# Patient Record
Sex: MALE | Race: WHITE | Employment: FULL TIME | URBAN - METROPOLITAN AREA
[De-identification: names, ages, dates, MRNs, and addresses within clinical notes are randomized per-mention and may not be internally consistent; named-entity substitution may affect disease eponyms.]

---

## 2017-01-09 RX ORDER — LOSARTAN POTASSIUM 100 MG/1
TABLET ORAL
Qty: 90 TABLET | Refills: 0 | Status: SHIPPED | OUTPATIENT
Start: 2017-01-09 | End: 2017-04-07 | Stop reason: SDUPTHER

## 2017-02-23 ENCOUNTER — OFFICE VISIT (OUTPATIENT)
Dept: FAMILY MEDICINE CLINIC | Age: 57
End: 2017-02-23

## 2017-02-23 VITALS
RESPIRATION RATE: 18 BRPM | BODY MASS INDEX: 30.93 KG/M2 | TEMPERATURE: 99.4 F | OXYGEN SATURATION: 98 % | SYSTOLIC BLOOD PRESSURE: 118 MMHG | DIASTOLIC BLOOD PRESSURE: 74 MMHG | HEIGHT: 74 IN | HEART RATE: 118 BPM | WEIGHT: 241 LBS

## 2017-02-23 DIAGNOSIS — J11.1 INFLUENZA-LIKE ILLNESS: ICD-10-CM

## 2017-02-23 DIAGNOSIS — J40 BRONCHITIS: Primary | ICD-10-CM

## 2017-02-23 LAB
INFLUENZA A ANTIBODY: NORMAL
INFLUENZA B ANTIBODY: NORMAL

## 2017-02-23 PROCEDURE — 87804 INFLUENZA ASSAY W/OPTIC: CPT | Performed by: NURSE PRACTITIONER

## 2017-02-23 PROCEDURE — 99213 OFFICE O/P EST LOW 20 MIN: CPT | Performed by: NURSE PRACTITIONER

## 2017-02-23 RX ORDER — AMOXICILLIN 875 MG/1
875 TABLET, COATED ORAL 2 TIMES DAILY
Qty: 20 TABLET | Refills: 0 | Status: SHIPPED | OUTPATIENT
Start: 2017-02-23 | End: 2017-03-05

## 2017-02-23 RX ORDER — GUAIFENESIN AND CODEINE PHOSPHATE 100; 10 MG/5ML; MG/5ML
5-10 SOLUTION ORAL EVERY 4 HOURS PRN
Qty: 120 ML | Refills: 0 | Status: SHIPPED | OUTPATIENT
Start: 2017-02-23 | End: 2017-03-02

## 2017-04-07 RX ORDER — LOSARTAN POTASSIUM 100 MG/1
100 TABLET ORAL DAILY
Qty: 90 TABLET | Refills: 1 | Status: SHIPPED | OUTPATIENT
Start: 2017-04-07 | End: 2017-09-19 | Stop reason: SDUPTHER

## 2017-07-06 ENCOUNTER — TELEPHONE (OUTPATIENT)
Dept: FAMILY MEDICINE CLINIC | Age: 57
End: 2017-07-06

## 2017-07-18 ENCOUNTER — TELEPHONE (OUTPATIENT)
Dept: FAMILY MEDICINE CLINIC | Age: 57
End: 2017-07-18

## 2017-07-18 RX ORDER — SITAGLIPTIN AND METFORMIN HYDROCHLORIDE 500; 50 MG/1; MG/1
1 TABLET, FILM COATED ORAL DAILY
Qty: 90 TABLET | Refills: 3 | Status: CANCELLED | OUTPATIENT
Start: 2017-07-18

## 2017-07-18 RX ORDER — SITAGLIPTIN AND METFORMIN HYDROCHLORIDE 500; 50 MG/1; MG/1
1 TABLET, FILM COATED ORAL DAILY
Qty: 90 TABLET | Refills: 0 | Status: SHIPPED | OUTPATIENT
Start: 2017-07-18 | End: 2017-09-19 | Stop reason: SDUPTHER

## 2017-09-19 ENCOUNTER — OFFICE VISIT (OUTPATIENT)
Dept: FAMILY MEDICINE CLINIC | Age: 57
End: 2017-09-19

## 2017-09-19 VITALS
BODY MASS INDEX: 31.32 KG/M2 | HEART RATE: 92 BPM | OXYGEN SATURATION: 98 % | DIASTOLIC BLOOD PRESSURE: 72 MMHG | HEIGHT: 74 IN | SYSTOLIC BLOOD PRESSURE: 128 MMHG | RESPIRATION RATE: 12 BRPM | WEIGHT: 244 LBS

## 2017-09-19 DIAGNOSIS — F41.1 ANXIETY STATE: ICD-10-CM

## 2017-09-19 DIAGNOSIS — R35.1 NOCTURIA: ICD-10-CM

## 2017-09-19 DIAGNOSIS — E11.42 DIABETIC POLYNEUROPATHY ASSOCIATED WITH TYPE 2 DIABETES MELLITUS (HCC): ICD-10-CM

## 2017-09-19 DIAGNOSIS — E55.9 VITAMIN D DEFICIENCY: ICD-10-CM

## 2017-09-19 DIAGNOSIS — E78.00 HYPERCHOLESTEREMIA: ICD-10-CM

## 2017-09-19 DIAGNOSIS — I10 ESSENTIAL HYPERTENSION: ICD-10-CM

## 2017-09-19 DIAGNOSIS — E11.9 TYPE 2 DIABETES MELLITUS WITHOUT COMPLICATION, WITHOUT LONG-TERM CURRENT USE OF INSULIN (HCC): Primary | ICD-10-CM

## 2017-09-19 LAB
CREATININE URINE: 160.2 MG/DL (ref 39–259)
HBA1C MFR BLD: 6.2 %
MICROALBUMIN UR-MCNC: <1.2 MG/DL
MICROALBUMIN/CREAT UR-RTO: NORMAL MG/G (ref 0–30)

## 2017-09-19 PROCEDURE — 99214 OFFICE O/P EST MOD 30 MIN: CPT | Performed by: FAMILY MEDICINE

## 2017-09-19 PROCEDURE — 83036 HEMOGLOBIN GLYCOSYLATED A1C: CPT | Performed by: FAMILY MEDICINE

## 2017-09-19 RX ORDER — SITAGLIPTIN AND METFORMIN HYDROCHLORIDE 500; 50 MG/1; MG/1
1 TABLET, FILM COATED ORAL DAILY
Qty: 90 TABLET | Refills: 3 | Status: SHIPPED | OUTPATIENT
Start: 2017-09-19 | End: 2018-10-10 | Stop reason: SDUPTHER

## 2017-09-19 RX ORDER — LOSARTAN POTASSIUM 100 MG/1
100 TABLET ORAL DAILY
Qty: 90 TABLET | Refills: 3 | Status: SHIPPED | OUTPATIENT
Start: 2017-09-19 | End: 2018-10-10 | Stop reason: SDUPTHER

## 2017-09-19 ASSESSMENT — PATIENT HEALTH QUESTIONNAIRE - PHQ9
SUM OF ALL RESPONSES TO PHQ QUESTIONS 1-9: 0
2. FEELING DOWN, DEPRESSED OR HOPELESS: 0
1. LITTLE INTEREST OR PLEASURE IN DOING THINGS: 0
SUM OF ALL RESPONSES TO PHQ9 QUESTIONS 1 & 2: 0

## 2017-11-20 ENCOUNTER — NURSE ONLY (OUTPATIENT)
Dept: FAMILY MEDICINE CLINIC | Age: 57
End: 2017-11-20

## 2017-11-20 DIAGNOSIS — E78.00 HYPERCHOLESTEREMIA: ICD-10-CM

## 2017-11-20 DIAGNOSIS — E55.9 VITAMIN D DEFICIENCY: ICD-10-CM

## 2017-11-20 DIAGNOSIS — I10 ESSENTIAL HYPERTENSION: ICD-10-CM

## 2017-11-20 DIAGNOSIS — R35.1 NOCTURIA: ICD-10-CM

## 2017-11-20 LAB
A/G RATIO: 1.9 (ref 1.1–2.2)
ALBUMIN SERPL-MCNC: 4.5 G/DL (ref 3.4–5)
ALP BLD-CCNC: 75 U/L (ref 40–129)
ALT SERPL-CCNC: 17 U/L (ref 10–40)
ANION GAP SERPL CALCULATED.3IONS-SCNC: 14 MMOL/L (ref 3–16)
AST SERPL-CCNC: 13 U/L (ref 15–37)
BILIRUB SERPL-MCNC: 0.9 MG/DL (ref 0–1)
BUN BLDV-MCNC: 13 MG/DL (ref 7–20)
CALCIUM SERPL-MCNC: 9.1 MG/DL (ref 8.3–10.6)
CHLORIDE BLD-SCNC: 96 MMOL/L (ref 99–110)
CHOLESTEROL, TOTAL: 203 MG/DL (ref 0–199)
CO2: 26 MMOL/L (ref 21–32)
CREAT SERPL-MCNC: 0.6 MG/DL (ref 0.9–1.3)
GFR AFRICAN AMERICAN: >60
GFR NON-AFRICAN AMERICAN: >60
GLOBULIN: 2.4 G/DL
GLUCOSE BLD-MCNC: 253 MG/DL (ref 70–99)
HDLC SERPL-MCNC: 38 MG/DL (ref 40–60)
LDL CHOLESTEROL CALCULATED: 111 MG/DL
POTASSIUM SERPL-SCNC: 4.6 MMOL/L (ref 3.5–5.1)
PROSTATE SPECIFIC ANTIGEN: 0.52 NG/ML (ref 0–4)
SODIUM BLD-SCNC: 136 MMOL/L (ref 136–145)
TOTAL PROTEIN: 6.9 G/DL (ref 6.4–8.2)
TRIGL SERPL-MCNC: 268 MG/DL (ref 0–150)
VITAMIN D 25-HYDROXY: 28.1 NG/ML
VLDLC SERPL CALC-MCNC: 54 MG/DL

## 2017-11-20 PROCEDURE — 36415 COLL VENOUS BLD VENIPUNCTURE: CPT | Performed by: FAMILY MEDICINE

## 2017-11-24 RX ORDER — ATORVASTATIN CALCIUM 40 MG/1
40 TABLET, FILM COATED ORAL DAILY
Qty: 90 TABLET | Refills: 1 | Status: SHIPPED | OUTPATIENT
Start: 2017-11-24 | End: 2018-03-02

## 2018-01-03 ENCOUNTER — OFFICE VISIT (OUTPATIENT)
Dept: FAMILY MEDICINE CLINIC | Age: 58
End: 2018-01-03

## 2018-01-03 ENCOUNTER — TELEPHONE (OUTPATIENT)
Dept: FAMILY MEDICINE CLINIC | Age: 58
End: 2018-01-03

## 2018-01-03 VITALS
HEIGHT: 74 IN | TEMPERATURE: 97.7 F | HEART RATE: 96 BPM | BODY MASS INDEX: 30.6 KG/M2 | SYSTOLIC BLOOD PRESSURE: 122 MMHG | OXYGEN SATURATION: 99 % | RESPIRATION RATE: 18 BRPM | WEIGHT: 238.4 LBS | DIASTOLIC BLOOD PRESSURE: 84 MMHG

## 2018-01-03 DIAGNOSIS — J21.8 ACUTE BRONCHIOLITIS DUE TO OTHER SPECIFIED ORGANISMS: Primary | ICD-10-CM

## 2018-01-03 PROCEDURE — 99214 OFFICE O/P EST MOD 30 MIN: CPT | Performed by: NURSE PRACTITIONER

## 2018-01-03 RX ORDER — AZITHROMYCIN 250 MG/1
TABLET, FILM COATED ORAL
Qty: 1 PACKET | Refills: 0 | Status: SHIPPED | OUTPATIENT
Start: 2018-01-03 | End: 2018-01-13

## 2018-01-03 RX ORDER — ALBUTEROL SULFATE 90 UG/1
2 AEROSOL, METERED RESPIRATORY (INHALATION) EVERY 6 HOURS PRN
Qty: 1 INHALER | Refills: 1 | Status: SHIPPED | OUTPATIENT
Start: 2018-01-03 | End: 2018-03-02

## 2018-01-03 RX ORDER — PREDNISONE 10 MG/1
60 TABLET ORAL DAILY
Qty: 30 TABLET | Refills: 0 | Status: SHIPPED | OUTPATIENT
Start: 2018-01-03 | End: 2018-01-08

## 2018-01-03 ASSESSMENT — ENCOUNTER SYMPTOMS
COUGH: 1
GASTROINTESTINAL NEGATIVE: 1
EYES NEGATIVE: 1
SPUTUM PRODUCTION: 1

## 2018-01-03 NOTE — TELEPHONE ENCOUNTER
[facility] DRUG STORE [facility] [address] [phone] [facility] [phone]    Calling for clairfication on the inhaler - has 2 different directions

## 2018-01-03 NOTE — PROGRESS NOTES
Subjective     CC: flu like  Chief Complaint   Patient presents with    Cough     PT C/O COUGH W/BROWN PHLEGM, BILATERAL EAR PAIN, SORE THROAT, NECK PAIN, SL MILIAN, NASAL DR BROWN, PND, WHEEZING, BODY ACHES AND FEELS LIKE HAS A TEMP BUT HASN'T CHECKED X3 WKS. PT WENT TO URGENT CARE LAST WEEK AND WAS TOLD HE HAS A VIRUS BUT WAS ONLY GIVEN SUDAFED AND DECONGESTANTS. PT HAS TRIED TYLENOL. HPI  59year old male with 3 week history of flulike symptoms. Positive, fever, cough, wheezing, bodyaches, thick sputum. Worse at night when laying down. Was seen at an Urgent care told he had a virus. Has been resting and taking tylenol. Denies any n/v/d/c/sop or cp. No flu shot this year. Positive sick contacts    Objective   Vitals:    01/03/18 0938   BP: 122/84   Site: Left Arm   Position: Sitting   Cuff Size: Large Adult   Pulse: 96   Resp: 18   Temp: 97.7 °F (36.5 °C)   TempSrc: Oral   SpO2: 99%   Weight: 238 lb 6.4 oz (108.1 kg)   Height: 6' 1.5\" (1.867 m)   Review of Systems   Constitutional: Positive for fever and malaise/fatigue. HENT: Positive for congestion and ear pain. Eyes: Negative. Respiratory: Positive for cough and sputum production. Cardiovascular: Negative. Gastrointestinal: Negative. Genitourinary: Negative. Musculoskeletal: Positive for myalgias. Skin: Negative. Neurological: Negative. Body mass index is 31.03 kg/m². Physical Exam   Constitutional: He is oriented to person, place, and time. He appears well-developed and well-nourished. HENT:   Head: Normocephalic and atraumatic. Right Ear: External ear normal.   Left Ear: External ear normal.   Nose: Nose normal.   Mouth/Throat: Oropharynx is clear and moist.   Eyes: Conjunctivae and EOM are normal. Pupils are equal, round, and reactive to light. Neck: Normal range of motion. Neck supple. Cardiovascular: Normal rate, regular rhythm, normal heart sounds and intact distal pulses.     Pulmonary/Chest: Effort normal.

## 2018-03-02 ENCOUNTER — OFFICE VISIT (OUTPATIENT)
Dept: FAMILY MEDICINE CLINIC | Age: 58
End: 2018-03-02

## 2018-03-02 VITALS
WEIGHT: 236 LBS | SYSTOLIC BLOOD PRESSURE: 114 MMHG | DIASTOLIC BLOOD PRESSURE: 76 MMHG | HEART RATE: 84 BPM | BODY MASS INDEX: 30.29 KG/M2 | HEIGHT: 74 IN | RESPIRATION RATE: 16 BRPM

## 2018-03-02 DIAGNOSIS — E78.00 PURE HYPERCHOLESTEROLEMIA: ICD-10-CM

## 2018-03-02 DIAGNOSIS — E11.9 TYPE 2 DIABETES MELLITUS WITHOUT COMPLICATION, WITHOUT LONG-TERM CURRENT USE OF INSULIN (HCC): Primary | ICD-10-CM

## 2018-03-02 DIAGNOSIS — T46.4X5A ACE-INHIBITOR COUGH: ICD-10-CM

## 2018-03-02 DIAGNOSIS — R05.8 ACE-INHIBITOR COUGH: ICD-10-CM

## 2018-03-02 DIAGNOSIS — Z78.9 STATIN INTOLERANCE: ICD-10-CM

## 2018-03-02 DIAGNOSIS — I10 ESSENTIAL HYPERTENSION: ICD-10-CM

## 2018-03-02 DIAGNOSIS — F41.1 ANXIETY STATE: ICD-10-CM

## 2018-03-02 LAB — HBA1C MFR BLD: 8.6 %

## 2018-03-02 PROCEDURE — 83036 HEMOGLOBIN GLYCOSYLATED A1C: CPT | Performed by: FAMILY MEDICINE

## 2018-03-02 PROCEDURE — 99214 OFFICE O/P EST MOD 30 MIN: CPT | Performed by: FAMILY MEDICINE

## 2018-03-02 NOTE — PROGRESS NOTES
Subjective:      Patient ID: Francine Dorado is a 59 y.o. male. HPI  Walking a lot on job generally - works at [email or web address] - spends time in car  Chief Complaint   Patient presents with    Diabetes     3 MO DM ROUTINE FOLLOW UP [address] Hyperlipidemia     HYPERLIPIDEMIA Bri, DID NOT WANT TO EAT STOPPED TAKING 2 WEEKS AGO, NOT SURE HOW LONG HE WAS ON IT BUT WANTS TO CHANGE  HE IS FASTING TODAY     Other     PT IS NOT INTERESTED IN FLU ON PNEUMO      Travelling less generally  BP Readings from Last 3 Encounters:   03/02/18 114/76   01/03/18 122/84   09/19/17 128/72     Pulse Readings from Last 3 Encounters:   03/02/18 84   01/03/18 96   09/19/17 92     Wt Readings from Last 3 Encounters:   03/02/18 236 lb (107 kg)   01/03/18 238 lb 6.4 oz (108.1 kg)   09/19/17 244 lb (110.7 kg)     Watching diet closer - had flu illness - effected diet some temporarily  Eating habits better - not perfect - less cho - more fruit/ veggies  No appetite, tired, dizzy on lipitor - stopped w/ d/c of lipitor  Endangered driving - feels better significantly off statin. not checking bs often  Lab Results   Component Value Date    LABA1C 6.2 09/19/2017     Lab Results   Component Value Date    .3 04/09/2013     zoloft working ok for mood  No cp/palp/sob  Some increase in thirst, dry mouth/   Review of Systems  Vision stable - utd on eye checks  No neuropathy sx  Objective:   Physical Exam   Constitutional: He appears well-developed. No distress. HENT:   Mouth/Throat: Oropharynx is clear and moist.   Eyes: Conjunctivae are normal. No scleral icterus. Cardiovascular: Normal rate, regular rhythm and normal heart sounds. Exam reveals no gallop. No murmur heard. Pulmonary/Chest: Effort normal and breath sounds normal. No respiratory distress. He has no wheezes. He has no rhonchi. He has no rales. Abdominal: Soft. Bowel sounds are normal. He exhibits no distension.  There is no tenderness. Musculoskeletal: He exhibits no edema. Neurological: He is alert. Skin: Skin is intact. No rash noted. No erythema. Psychiatric: He has a normal mood and affect. Assessment:      1. Type 2 diabetes mellitus without complication, without long-term current use of insulin ([facility])  POCT glycosylated hemoglobin (Hb A1C)   2. Pure hypercholesterolemia     3. Anxiety state     4. ACE-inhibitor cough     5. Essential hypertension     6. Statin intolerance             Plan:      Hold on statin - d/w pt risk reduction for cv disease - bp/ bs control stressed    Diet/ exercise d/w pt  Gradually increase your exercise and increase omega 3 fatty acid sources in your diet such as fatty fish ( cod, tuna, salmon), walnuts or consider supplements such as fish oil or krill oil to boost the HDL.   Recheck labs in next 6 months  Recheck a1c 3 months  Feet care dw/ pt  Eye exam annually

## 2018-08-14 ENCOUNTER — OFFICE VISIT (OUTPATIENT)
Dept: FAMILY MEDICINE CLINIC | Age: 58
End: 2018-08-14

## 2018-08-14 VITALS
DIASTOLIC BLOOD PRESSURE: 80 MMHG | HEART RATE: 70 BPM | BODY MASS INDEX: 30.54 KG/M2 | RESPIRATION RATE: 14 BRPM | HEIGHT: 74 IN | TEMPERATURE: 97.6 F | WEIGHT: 238 LBS | SYSTOLIC BLOOD PRESSURE: 120 MMHG

## 2018-08-14 DIAGNOSIS — B96.89 ACUTE BACTERIAL SINUSITIS: Primary | ICD-10-CM

## 2018-08-14 DIAGNOSIS — J01.90 ACUTE BACTERIAL SINUSITIS: Primary | ICD-10-CM

## 2018-08-14 PROCEDURE — 99213 OFFICE O/P EST LOW 20 MIN: CPT | Performed by: REGISTERED NURSE

## 2018-08-14 RX ORDER — AMOXICILLIN AND CLAVULANATE POTASSIUM 875; 125 MG/1; MG/1
1 TABLET, FILM COATED ORAL 2 TIMES DAILY WITH MEALS
Qty: 20 TABLET | Refills: 0 | Status: SHIPPED | OUTPATIENT
Start: 2018-08-14 | End: 2018-08-24

## 2018-08-14 ASSESSMENT — ENCOUNTER SYMPTOMS
SORE THROAT: 0
TROUBLE SWALLOWING: 0
COUGH: 1
WHEEZING: 0
SINUS PAIN: 1
SINUS PRESSURE: 1
RHINORRHEA: 0
SHORTNESS OF BREATH: 0
CHEST TIGHTNESS: 0

## 2018-08-14 ASSESSMENT — PATIENT HEALTH QUESTIONNAIRE - PHQ9
SUM OF ALL RESPONSES TO PHQ QUESTIONS 1-9: 0
SUM OF ALL RESPONSES TO PHQ QUESTIONS 1-9: 0
2. FEELING DOWN, DEPRESSED OR HOPELESS: 0
1. LITTLE INTEREST OR PLEASURE IN DOING THINGS: 0
SUM OF ALL RESPONSES TO PHQ9 QUESTIONS 1 & 2: 0

## 2018-08-14 NOTE — PATIENT INSTRUCTIONS
if not pregnant, but should be given with food to avoid nausea. Avoid Ibuprofen if you have high blood pressure, CHF, or kidney problems. 6.Gargle: (DAY ONE OF SYMPTOMS) Gargle in the back of the throat with the head tilted back and to the sides with a strong mouthwash  ( Listerine or Scope) after meals and at bedtime at least 4 -5 times a day. This helps kill bacteria and viruses in the back of the throat and will shorten the duration and decrease the severity of your symptoms: sore throat, cough, ear popping,/ear pain, and possibly dizziness. 7. Smoking: Avoid smoking or exposure to second hand smoke. 8. Zinc: (DAY ONE OF SYMPTOMS)  Zinc lozenges such as Cold Elias (available most stores), or Basic ([employer] brand) will help shorten the duration and lessen symptoms such as sore throat, cough, nasal congestion, runny nose, and post nasal drip. Use 1 lozenge every 2-4 hours ( after meals if stomach is sensitive). Children can use 10-15 mg or less 3-4 times a day or Zinc lollypops. In pregnancy limit to 50-60 mg a day for 7 days as prenatals have Zinc also.    With diarrhea use zinc pills 50 mg 1/2 to 1 pill 2x/day. 9. Vitamins: Vitamin C 500 mg with breakfast and dinner. Children and pregnant women should drink citrus juices. This speeds healing and strengthens immune system. 10. Chest Symptoms: Vicks Vapor rub to the chest at bedtime. 11. Decongestants: Avoid all decongestants. Try all of the above starting with day 1 of symptoms. If Strep throat symptoms appear call to be seen in the office as soon as possible and don't gargle on that day. Newborns, infants, or anyone with earaches or influenza may need to be seen quickly. Adults with fevers over 103 degrees or shortness of breath should call the office immediately. Patient Education        Sinusitis: Care Instructions  Your Care Instructions    Sinusitis is an infection of the lining of the sinus cavities in your head.  Sinusitis often follows a days in a row. Using it for more than 3 days can make your congestion worse. When should you call for help? Call your doctor now or seek immediate medical care if:    · You have new or worse swelling or redness in your face or around your eyes.     · You have a new or higher fever.    Watch closely for changes in your health, and be sure to contact your doctor if:    · You have new or worse facial pain.     · The mucus from your nose becomes thicker (like pus) or has new blood in it.     · You are not getting better as expected. Where can you learn more? Go to [email or web address] and sign in to your [employer] account. Enter Y634 in the [employer] box to learn more about \"Sinusitis: Care Instructions. \"     If you do not have an account, please click on the \"Sign Up Now\" link. Current as of: May 12, 2017  Content Version: 11.7  © 4245-3743 [employer]. Care instructions adapted under license by [facility] ([facility]). If you have questions about a medical condition or this instruction, always ask your healthcare professional. [place] [address] any warranty or liability for your use of this information.

## 2018-08-14 NOTE — PROGRESS NOTES
[facility] Family Medicine  Clinic Note    Date: 8/14/2018                                               Subjective/Objective:     Chief Complaint   Patient presents with    Sinusitis     PT STATES POSS SINUS INFECTIONS, LOTS OF DRAINAGE, SORE THROAT, BILATERAL EAR PRESSURE, MUCUS IS BROWNISH YELLOW, ONGOING WEEK 1/2, OTC MEDICATION NOT TRIED       HPI  patient presents for possible sinus infection going on for 10 days. Patient is having a lot of drainage, sore throat, bilateral ear pressure, and mucus  Patient has not attempted to treat with anything over-the-counter. Denies fever, chills, ear discharge, hearing loss, postnasal drip, runny nose, sore throat, shortness of breath, wheezing, chest pain, and headache. Patient Active Problem List    Diagnosis Date Noted    Statin intolerance 03/02/2018    [facility] 89/42/7496    Hypertension 04/09/2013    ACE-inhibitor cough 08/24/2012    Sprain of ribs 05/23/2011    Acute bronchitis 05/23/2011    Pure hypercholesterolemia 05/23/2011    Anxiety state 05/23/2011       Past Medical History:   Diagnosis Date    Acute bronchitis     Anxiety     Hypercholesterolemia     Sprain of ribs        No past surgical history on file.     Office Visit on 03/02/2018   Component Date Value Ref Range Status    Hemoglobin A1C 03/02/2018 8.6  % Final       Family History   Problem Relation Age of Onset    Diabetes Mother     High Cholesterol Mother     Diabetes Father     High Cholesterol Father     Cancer Father         prostate    Prostate Cancer Father     Heart Failure Maternal Grandmother     Heart Disease Maternal Grandmother     Cancer Maternal Grandfather        Current Outpatient Prescriptions   Medication Sig Dispense Refill    JANUMET  MG per tablet Take 1 tablet by mouth daily 90 tablet 3    losartan (COZAAR) 100 MG tablet Take 1 tablet by mouth daily 90 tablet 3    sertraline (ZOLOFT) 50 MG tablet

## 2018-09-25 ENCOUNTER — OFFICE VISIT (OUTPATIENT)
Dept: FAMILY MEDICINE CLINIC | Age: 58
End: 2018-09-25
Payer: COMMERCIAL

## 2018-09-25 VITALS
RESPIRATION RATE: 16 BRPM | SYSTOLIC BLOOD PRESSURE: 128 MMHG | BODY MASS INDEX: 30.83 KG/M2 | HEIGHT: 74 IN | DIASTOLIC BLOOD PRESSURE: 80 MMHG | WEIGHT: 240.2 LBS | HEART RATE: 84 BPM

## 2018-09-25 DIAGNOSIS — I10 ESSENTIAL HYPERTENSION: ICD-10-CM

## 2018-09-25 DIAGNOSIS — Z23 NEED FOR PROPHYLACTIC VACCINATION AGAINST STREPTOCOCCUS PNEUMONIAE (PNEUMOCOCCUS): ICD-10-CM

## 2018-09-25 DIAGNOSIS — E55.9 VITAMIN D INSUFFICIENCY: ICD-10-CM

## 2018-09-25 DIAGNOSIS — E78.00 PURE HYPERCHOLESTEROLEMIA: ICD-10-CM

## 2018-09-25 DIAGNOSIS — E11.9 TYPE 2 DIABETES MELLITUS WITHOUT COMPLICATION, WITHOUT LONG-TERM CURRENT USE OF INSULIN (HCC): Primary | ICD-10-CM

## 2018-09-25 DIAGNOSIS — Z23 NEED FOR PROPHYLACTIC VACCINATION AND INOCULATION AGAINST VARICELLA: ICD-10-CM

## 2018-09-25 LAB
CREATININE URINE: 78.6 MG/DL (ref 39–259)
HBA1C MFR BLD: 7.4 %
MICROALBUMIN UR-MCNC: <1.2 MG/DL
MICROALBUMIN/CREAT UR-RTO: NORMAL MG/G (ref 0–30)

## 2018-09-25 PROCEDURE — 83036 HEMOGLOBIN GLYCOSYLATED A1C: CPT | Performed by: REGISTERED NURSE

## 2018-09-25 PROCEDURE — 99214 OFFICE O/P EST MOD 30 MIN: CPT | Performed by: REGISTERED NURSE

## 2018-09-25 ASSESSMENT — ENCOUNTER SYMPTOMS
DIARRHEA: 0
CHEST TIGHTNESS: 0
VOMITING: 0
CONSTIPATION: 0
WHEEZING: 0
ABDOMINAL PAIN: 0
SHORTNESS OF BREATH: 0
COUGH: 0
NAUSEA: 0

## 2018-09-25 ASSESSMENT — PATIENT HEALTH QUESTIONNAIRE - PHQ9
2. FEELING DOWN, DEPRESSED OR HOPELESS: 0
SUM OF ALL RESPONSES TO PHQ QUESTIONS 1-9: 0
1. LITTLE INTEREST OR PLEASURE IN DOING THINGS: 0
SUM OF ALL RESPONSES TO PHQ QUESTIONS 1-9: 0
SUM OF ALL RESPONSES TO PHQ9 QUESTIONS 1 & 2: 0

## 2018-09-25 NOTE — PROGRESS NOTES
[facility] Family Medicine  Clinic Note    Date: 9/25/2018                                               Subjective/Objective:     Chief Complaint   Patient presents with    Hypertension     ROUTINE VISIT FOR HTN MEDICATION REFILLS    Diabetes     ROUTINE VISIT FOR DM, A1C AND MICRO AND FOOT EXAM        HPI patient presents for routine hypertension and diabetes follow-up. Last a1c 8.6 in March. Due for Micro albumin and foot exam today. Diabetes Mellitus Type II:  Home blood sugar records: patient does not test.  No significant episodes of hypoglycemia. No polyuria, polydipsia, visual changes, foot problems, GI upset. Diabetic diet compliance:  noncompliant: not all the time, but has been watching sugar intake. Weight trend: stable. Current exercise: walking a lot  during work, but no other physical activity. Eye exam current (within one year): yes. Lab Results   Component Value Date    LABA1C 7.4 09/25/2018    LABA1C 8.6 03/02/2018    LABA1C 6.2 09/19/2017     Lab Results   Component Value Date    LABMICR <1.20 09/19/2017    CREATININE 0.6 (L) 11/20/2017     Lab Results   Component Value Date    ALT 17 11/20/2017    AST 13 (L) 11/20/2017     No components found for: CHLPL  Lab Results   Component Value Date    TRIG 268 (H) 11/20/2017     Lab Results   Component Value Date    HDL 38 (L) 11/20/2017     Lab Results   Component Value Date    LDLCALC 111 (H) 11/20/2017       Hypertension:  Denies CP, SOB, visual changes, dizziness, palpitations or HA. He is adherent to a low sodium diet. Blood pressure typically runs 120/80 outside of the office. Hyperlipidemia: Statin intolerance. Patient has been trying to manage with diet. Has not been watching diet as well as he should. Has not been taking Omega 3 fish oil or baby aspirin.          Patient Active Problem List    Diagnosis Date Noted    Statin intolerance 03/02/2018    Type 2 diabetes mellitus without complication ([facility] [facility] 46/06/7776    place, and time. He appears well-developed and well-nourished. HENT:   Head: Normocephalic and atraumatic. Right Ear: Tympanic membrane, external ear and ear canal normal.   Left Ear: Tympanic membrane, external ear and ear canal normal.   Nose: Nose normal.   Mouth/Throat: Uvula is midline, oropharynx is clear and moist and mucous membranes are normal.   Eyes: Pupils are equal, round, and reactive to light. Conjunctivae and EOM are normal.   Neck: Normal range of motion. Neck supple. Normal carotid pulses present. Carotid bruit is not present. No thyromegaly present. Cardiovascular: Normal rate, regular rhythm, normal heart sounds and intact distal pulses. Pulmonary/Chest: Effort normal and breath sounds normal.   Lymphadenopathy:     He has no cervical adenopathy. Neurological: He is alert and oriented to person, place, and time. Visual inspection:  Deformity/amputation: absent  Skin lesions/pre-ulcerative calluses: absent  Edema: right- negative, left- negative    Sensory exam:  Monofilament sensation: normal  (minimum of 5 random plantar locations tested, avoiding callused areas - > 1 area with absence of sensation is + for neuropathy)    Plus at least one of the following:  Pulses: normal,   Pinprick: Intact  Proprioception: Intact  Vibration (128 Hz): Intact     Skin: Skin is warm and dry. Psychiatric: He has a normal mood and affect. His behavior is normal. Judgment and thought content normal.   Nursing note and vitals reviewed. Assessment/Plan     1. Type 2 diabetes mellitus without complication, without long-term current use of insulin ([facility])  A1c down to 7. 4! Patient wishes to continue to attempt lifestyle changes prior to increasing medication. DM foot exam completednormal.  Requested DM [facility] [employer]- completed this year. Collected microalbumin today. Educated patient on importance of portion control and increasing cardio exercise.   -  DIABETES FOOT EXAM  - POCT glycosylated hemoglobin (Hb A1C)  - Microalbumin / Creatinine Urine Ratio; Future  - Microalbumin / Creatinine Urine Ratio  - Comprehensive Metabolic Panel; Future    2. Essential hypertension  BP stable. Goal <130/80 continue current regimen. Check renal function. - Comprehensive Metabolic Panel; Future  - Lipid Panel; Future    3. Pure hypercholesterolemia  Instructed patient on importance of taking daily 81 mg aspirin and omega-3 Fish oil. Check fasting lipids. - Lipid Panel; Future    4. Need for prophylactic vaccination against Streptococcus pneumoniae (pneumococcus)  Declines. 5. Need for prophylactic vaccination and inoculation against varicella  Due.  - zoster recombinant adjuvanted vaccine (SHINGRIX) 50 MCG SUSR injection; 50 MCG IM then repeat 2-6 months. Dispense: 0.5 mL; Refill: 1    6. Vitamin D insufficiency  Check Vit D levels. - Vitamin D 25 Hydroxy; Future      Orders Placed This Encounter   Procedures    Microalbumin / Creatinine Urine Ratio     Standing Status:   Future     Number of Occurrences:   1     Standing Expiration Date:   9/25/2019    Comprehensive Metabolic Panel     Standing Status:   Future     Standing Expiration Date:   9/25/2019    Lipid Panel     Standing Status:   Future     Standing Expiration Date:   9/25/2019     Order Specific Question:   Is Patient Fasting?/# of Hours     Answer:   10    Vitamin D 25 Hydroxy     Standing Status:   Future     Standing Expiration Date:   9/25/2019    POCT glycosylated hemoglobin (Hb A1C)    HM DIABETES FOOT EXAM       Return in about 3 months (around 12/25/2018) for HTN, Hypyerlipidemia, DM with Dr. Artie Bains.     Dalia Sever, NP    9/25/2018  3:13 PM

## 2018-10-02 ENCOUNTER — NURSE ONLY (OUTPATIENT)
Dept: FAMILY MEDICINE CLINIC | Age: 58
End: 2018-10-02
Payer: COMMERCIAL

## 2018-10-02 DIAGNOSIS — E55.9 VITAMIN D INSUFFICIENCY: ICD-10-CM

## 2018-10-02 DIAGNOSIS — I10 ESSENTIAL HYPERTENSION: ICD-10-CM

## 2018-10-02 DIAGNOSIS — E11.9 TYPE 2 DIABETES MELLITUS WITHOUT COMPLICATION, WITHOUT LONG-TERM CURRENT USE OF INSULIN (HCC): ICD-10-CM

## 2018-10-02 DIAGNOSIS — E78.00 PURE HYPERCHOLESTEROLEMIA: ICD-10-CM

## 2018-10-02 LAB
A/G RATIO: 2 (ref 1.1–2.2)
ALBUMIN SERPL-MCNC: 4.5 G/DL (ref 3.4–5)
ALP BLD-CCNC: 90 U/L (ref 40–129)
ALT SERPL-CCNC: 14 U/L (ref 10–40)
ANION GAP SERPL CALCULATED.3IONS-SCNC: 11 MMOL/L (ref 3–16)
AST SERPL-CCNC: 11 U/L (ref 15–37)
BILIRUB SERPL-MCNC: 0.8 MG/DL (ref 0–1)
BUN BLDV-MCNC: 12 MG/DL (ref 7–20)
CALCIUM SERPL-MCNC: 9.1 MG/DL (ref 8.3–10.6)
CHLORIDE BLD-SCNC: 100 MMOL/L (ref 99–110)
CHOLESTEROL, TOTAL: 188 MG/DL (ref 0–199)
CO2: 26 MMOL/L (ref 21–32)
CREAT SERPL-MCNC: 0.7 MG/DL (ref 0.8–1.3)
GFR AFRICAN AMERICAN: >60
GFR NON-AFRICAN AMERICAN: >60
GLOBULIN: 2.3 G/DL
GLUCOSE BLD-MCNC: 248 MG/DL (ref 70–99)
HDLC SERPL-MCNC: 39 MG/DL (ref 40–60)
LDL CHOLESTEROL CALCULATED: 95 MG/DL
POTASSIUM SERPL-SCNC: 5.2 MMOL/L (ref 3.5–5.1)
SODIUM BLD-SCNC: 137 MMOL/L (ref 136–145)
TOTAL PROTEIN: 6.8 G/DL (ref 6.4–8.2)
TRIGL SERPL-MCNC: 270 MG/DL (ref 0–150)
VITAMIN D 25-HYDROXY: 26.2 NG/ML
VLDLC SERPL CALC-MCNC: 54 MG/DL

## 2018-10-02 PROCEDURE — 36415 COLL VENOUS BLD VENIPUNCTURE: CPT | Performed by: REGISTERED NURSE

## 2018-10-10 ENCOUNTER — TELEPHONE (OUTPATIENT)
Dept: FAMILY MEDICINE CLINIC | Age: 58
End: 2018-10-10

## 2018-10-10 RX ORDER — SITAGLIPTIN AND METFORMIN HYDROCHLORIDE 500; 50 MG/1; MG/1
1 TABLET, FILM COATED ORAL DAILY
Qty: 90 TABLET | Refills: 1 | Status: SHIPPED | OUTPATIENT
Start: 2018-10-10 | End: 2019-02-20 | Stop reason: SDUPTHER

## 2018-10-10 RX ORDER — LOSARTAN POTASSIUM 100 MG/1
100 TABLET ORAL DAILY
Qty: 90 TABLET | Refills: 1 | Status: SHIPPED | OUTPATIENT
Start: 2018-10-10 | End: 2019-04-08 | Stop reason: SDUPTHER

## 2019-02-20 ENCOUNTER — OFFICE VISIT (OUTPATIENT)
Dept: FAMILY MEDICINE CLINIC | Age: 59
End: 2019-02-20
Payer: COMMERCIAL

## 2019-02-20 VITALS
WEIGHT: 240 LBS | SYSTOLIC BLOOD PRESSURE: 124 MMHG | RESPIRATION RATE: 14 BRPM | HEART RATE: 80 BPM | DIASTOLIC BLOOD PRESSURE: 78 MMHG | HEIGHT: 73 IN | BODY MASS INDEX: 31.81 KG/M2

## 2019-02-20 DIAGNOSIS — E87.5 HYPERKALEMIA: ICD-10-CM

## 2019-02-20 DIAGNOSIS — E78.5 HYPERLIPIDEMIA, UNSPECIFIED HYPERLIPIDEMIA TYPE: ICD-10-CM

## 2019-02-20 DIAGNOSIS — I10 ESSENTIAL HYPERTENSION: ICD-10-CM

## 2019-02-20 DIAGNOSIS — E11.69 DIABETES MELLITUS TYPE 2 IN OBESE (HCC): Primary | ICD-10-CM

## 2019-02-20 DIAGNOSIS — E66.9 DIABETES MELLITUS TYPE 2 IN OBESE (HCC): Primary | ICD-10-CM

## 2019-02-20 LAB
ANION GAP SERPL CALCULATED.3IONS-SCNC: 17 MMOL/L (ref 3–16)
BUN BLDV-MCNC: 10 MG/DL (ref 7–20)
CALCIUM SERPL-MCNC: 9.3 MG/DL (ref 8.3–10.6)
CHLORIDE BLD-SCNC: 98 MMOL/L (ref 99–110)
CO2: 24 MMOL/L (ref 21–32)
CREAT SERPL-MCNC: 0.7 MG/DL (ref 0.8–1.3)
GFR AFRICAN AMERICAN: >60
GFR NON-AFRICAN AMERICAN: >60
GLUCOSE BLD-MCNC: 369 MG/DL (ref 70–99)
POTASSIUM SERPL-SCNC: 4.8 MMOL/L (ref 3.5–5.1)
SODIUM BLD-SCNC: 139 MMOL/L (ref 136–145)

## 2019-02-20 PROCEDURE — 99214 OFFICE O/P EST MOD 30 MIN: CPT | Performed by: FAMILY MEDICINE

## 2019-02-20 RX ORDER — SILDENAFIL 100 MG/1
50-100 TABLET, FILM COATED ORAL PRN
Qty: 6 TABLET | Refills: 5 | Status: SHIPPED | OUTPATIENT
Start: 2019-02-20 | End: 2020-10-12

## 2019-02-20 RX ORDER — SITAGLIPTIN AND METFORMIN HYDROCHLORIDE 500; 50 MG/1; MG/1
1-2 TABLET, FILM COATED ORAL DAILY
Qty: 180 TABLET | Refills: 3 | Status: SHIPPED | OUTPATIENT
Start: 2019-02-20 | End: 2019-03-28 | Stop reason: SDUPTHER

## 2019-02-21 LAB
ESTIMATED AVERAGE GLUCOSE: 165.7 MG/DL
HBA1C MFR BLD: 7.4 %

## 2019-03-28 RX ORDER — SITAGLIPTIN AND METFORMIN HYDROCHLORIDE 500; 50 MG/1; MG/1
1-2 TABLET, FILM COATED ORAL DAILY
Qty: 180 TABLET | Refills: 3 | Status: SHIPPED | OUTPATIENT
Start: 2019-03-28 | End: 2020-06-23 | Stop reason: SDUPTHER

## 2019-04-08 RX ORDER — LOSARTAN POTASSIUM 100 MG/1
TABLET ORAL
Qty: 90 TABLET | Refills: 1 | Status: SHIPPED | OUTPATIENT
Start: 2019-04-08 | End: 2019-10-05 | Stop reason: SDUPTHER

## 2019-08-29 ENCOUNTER — OFFICE VISIT (OUTPATIENT)
Dept: FAMILY MEDICINE CLINIC | Age: 59
End: 2019-08-29
Payer: COMMERCIAL

## 2019-08-29 VITALS
WEIGHT: 242 LBS | BODY MASS INDEX: 31.06 KG/M2 | DIASTOLIC BLOOD PRESSURE: 72 MMHG | HEART RATE: 82 BPM | HEIGHT: 74 IN | SYSTOLIC BLOOD PRESSURE: 114 MMHG | RESPIRATION RATE: 14 BRPM

## 2019-08-29 DIAGNOSIS — E66.9 DIABETES MELLITUS TYPE 2 IN OBESE (HCC): Primary | ICD-10-CM

## 2019-08-29 DIAGNOSIS — Z11.59 ENCOUNTER FOR HEPATITIS C SCREENING TEST FOR LOW RISK PATIENT: ICD-10-CM

## 2019-08-29 DIAGNOSIS — Z12.5 PROSTATE CANCER SCREENING: ICD-10-CM

## 2019-08-29 DIAGNOSIS — E11.69 DIABETES MELLITUS TYPE 2 IN OBESE (HCC): Primary | ICD-10-CM

## 2019-08-29 DIAGNOSIS — E78.5 HYPERLIPIDEMIA, UNSPECIFIED HYPERLIPIDEMIA TYPE: ICD-10-CM

## 2019-08-29 DIAGNOSIS — E55.9 VITAMIN D DEFICIENCY: ICD-10-CM

## 2019-08-29 DIAGNOSIS — I10 ESSENTIAL HYPERTENSION: ICD-10-CM

## 2019-08-29 LAB
A/G RATIO: 2 (ref 1.1–2.2)
ALBUMIN SERPL-MCNC: 5.1 G/DL (ref 3.4–5)
ALP BLD-CCNC: 91 U/L (ref 40–129)
ALT SERPL-CCNC: 16 U/L (ref 10–40)
ANION GAP SERPL CALCULATED.3IONS-SCNC: 17 MMOL/L (ref 3–16)
AST SERPL-CCNC: 16 U/L (ref 15–37)
BILIRUB SERPL-MCNC: 1.1 MG/DL (ref 0–1)
BUN BLDV-MCNC: 15 MG/DL (ref 7–20)
CALCIUM SERPL-MCNC: 9.8 MG/DL (ref 8.3–10.6)
CHLORIDE BLD-SCNC: 96 MMOL/L (ref 99–110)
CHOLESTEROL, TOTAL: 199 MG/DL (ref 0–199)
CO2: 22 MMOL/L (ref 21–32)
CREAT SERPL-MCNC: 0.8 MG/DL (ref 0.8–1.3)
GFR AFRICAN AMERICAN: >60
GFR NON-AFRICAN AMERICAN: >60
GLOBULIN: 2.6 G/DL
GLUCOSE BLD-MCNC: 246 MG/DL (ref 70–99)
HBA1C MFR BLD: 6.9 %
HDLC SERPL-MCNC: 41 MG/DL (ref 40–60)
HEPATITIS C ANTIBODY INTERPRETATION: NORMAL
LDL CHOLESTEROL CALCULATED: 108 MG/DL
POTASSIUM SERPL-SCNC: 4.8 MMOL/L (ref 3.5–5.1)
PROSTATE SPECIFIC ANTIGEN: 0.6 NG/ML (ref 0–4)
SODIUM BLD-SCNC: 135 MMOL/L (ref 136–145)
TOTAL PROTEIN: 7.7 G/DL (ref 6.4–8.2)
TRIGL SERPL-MCNC: 251 MG/DL (ref 0–150)
VITAMIN D 25-HYDROXY: 33.5 NG/ML
VLDLC SERPL CALC-MCNC: 50 MG/DL

## 2019-08-29 PROCEDURE — 83036 HEMOGLOBIN GLYCOSYLATED A1C: CPT | Performed by: FAMILY MEDICINE

## 2019-08-29 PROCEDURE — 99214 OFFICE O/P EST MOD 30 MIN: CPT | Performed by: FAMILY MEDICINE

## 2019-08-29 ASSESSMENT — PATIENT HEALTH QUESTIONNAIRE - PHQ9
2. FEELING DOWN, DEPRESSED OR HOPELESS: 0
SUM OF ALL RESPONSES TO PHQ QUESTIONS 1-9: 0
SUM OF ALL RESPONSES TO PHQ9 QUESTIONS 1 & 2: 0
SUM OF ALL RESPONSES TO PHQ QUESTIONS 1-9: 0
1. LITTLE INTEREST OR PLEASURE IN DOING THINGS: 0

## 2019-08-29 NOTE — PROGRESS NOTES
or ex partner: Not on file     Emotionally abused: Not on file     Physically abused: Not on file     Forced sexual activity: Not on file   Other Topics Concern    Not on file   Social History Narrative    Not on file        Family History   Problem Relation Age of Onset    Diabetes Mother     High Cholesterol Mother     Diabetes Father     High Cholesterol Father     Cancer Father         prostate    Prostate Cancer Father     Heart Failure Maternal Grandmother     Heart Disease Maternal Grandmother     Cancer Maternal Grandfather         Physical Exam:  Physical Exam   Constitutional: He is oriented to person, place, and time. He appears well-developed and well-nourished. No distress. HENT:   Head: Normocephalic and atraumatic. Eyes: Conjunctivae are normal. No scleral icterus. Cardiovascular: Normal rate, regular rhythm, normal heart sounds and intact distal pulses. No murmur heard. Pulmonary/Chest: Effort normal and breath sounds normal. No respiratory distress. He has no wheezes. He has no rhonchi. He has no rales. Abdominal: Soft. He exhibits no distension. There is no tenderness. Musculoskeletal: He exhibits no edema. Neurological: He is alert and oriented to person, place, and time. 5/5 monofilament testing. Sensation grossly intact. Skin: Skin is warm and dry. Feet clear. No obvious lesion on face/ around eye   Psychiatric: He has a normal mood and affect. Nursing note and vitals reviewed.          Laboratory Studies:  Lab Results   Component Value Date    LABA1C 6.9 08/29/2019    LABA1C 7.4 02/20/2019    LABA1C 7.4 09/25/2018        No results found for: WBC, HGB, HCT, MCV, PLT     Lab Results   Component Value Date     02/20/2019    K 4.8 02/20/2019    CL 98 (L) 02/20/2019    CO2 24 02/20/2019    BUN 10 02/20/2019    CREATININE 0.7 (L) 02/20/2019    GLUCOSE 369 (H) 02/20/2019    CALCIUM 9.3 02/20/2019    PROT 6.8 10/02/2018    LABALBU 4.5 10/02/2018    BILITOT 0.8

## 2019-08-30 RX ORDER — PRAVASTATIN SODIUM 40 MG
40 TABLET ORAL EVERY EVENING
Qty: 30 TABLET | Refills: 3 | Status: SHIPPED | OUTPATIENT
Start: 2019-08-30 | End: 2021-11-18 | Stop reason: SDUPTHER

## 2019-09-03 ENCOUNTER — TELEPHONE (OUTPATIENT)
Dept: FAMILY MEDICINE CLINIC | Age: 59
End: 2019-09-03

## 2019-10-07 RX ORDER — LOSARTAN POTASSIUM 100 MG/1
TABLET ORAL
Qty: 90 TABLET | Refills: 4 | Status: SHIPPED | OUTPATIENT
Start: 2019-10-07 | End: 2020-12-14 | Stop reason: SDUPTHER

## 2019-12-05 ENCOUNTER — OFFICE VISIT (OUTPATIENT)
Dept: FAMILY MEDICINE CLINIC | Age: 59
End: 2019-12-05
Payer: COMMERCIAL

## 2019-12-05 ENCOUNTER — TELEPHONE (OUTPATIENT)
Dept: FAMILY MEDICINE CLINIC | Age: 59
End: 2019-12-05

## 2019-12-05 VITALS
HEART RATE: 70 BPM | SYSTOLIC BLOOD PRESSURE: 120 MMHG | DIASTOLIC BLOOD PRESSURE: 74 MMHG | HEIGHT: 74 IN | BODY MASS INDEX: 31.5 KG/M2 | RESPIRATION RATE: 16 BRPM

## 2019-12-05 DIAGNOSIS — L08.9 FINGER INFECTION: Primary | ICD-10-CM

## 2019-12-05 PROCEDURE — 99213 OFFICE O/P EST LOW 20 MIN: CPT | Performed by: FAMILY MEDICINE

## 2019-12-05 RX ORDER — CEPHALEXIN 500 MG/1
500 CAPSULE ORAL 3 TIMES DAILY
Qty: 15 CAPSULE | Refills: 0 | Status: SHIPPED | OUTPATIENT
Start: 2019-12-05 | End: 2019-12-10

## 2020-06-23 ENCOUNTER — OFFICE VISIT (OUTPATIENT)
Dept: FAMILY MEDICINE CLINIC | Age: 60
End: 2020-06-23
Payer: COMMERCIAL

## 2020-06-23 VITALS
BODY MASS INDEX: 31.5 KG/M2 | HEART RATE: 82 BPM | SYSTOLIC BLOOD PRESSURE: 132 MMHG | DIASTOLIC BLOOD PRESSURE: 78 MMHG | HEIGHT: 74 IN

## 2020-06-23 LAB
A/G RATIO: 2 (ref 1.1–2.2)
ALBUMIN SERPL-MCNC: 4.5 G/DL (ref 3.4–5)
ALP BLD-CCNC: 85 U/L (ref 40–129)
ALT SERPL-CCNC: 10 U/L (ref 10–40)
ANION GAP SERPL CALCULATED.3IONS-SCNC: 14 MMOL/L (ref 3–16)
AST SERPL-CCNC: 13 U/L (ref 15–37)
BILIRUB SERPL-MCNC: 1 MG/DL (ref 0–1)
BUN BLDV-MCNC: 8 MG/DL (ref 7–20)
CALCIUM SERPL-MCNC: 9.1 MG/DL (ref 8.3–10.6)
CHLORIDE BLD-SCNC: 97 MMOL/L (ref 99–110)
CHOLESTEROL, TOTAL: 182 MG/DL (ref 0–199)
CO2: 25 MMOL/L (ref 21–32)
CREAT SERPL-MCNC: 0.7 MG/DL (ref 0.8–1.3)
CREATININE URINE POCT: 200
GFR AFRICAN AMERICAN: >60
GFR NON-AFRICAN AMERICAN: >60
GLOBULIN: 2.3 G/DL
GLUCOSE BLD-MCNC: 167 MG/DL (ref 70–99)
HBA1C MFR BLD: 9.8 %
HDLC SERPL-MCNC: 43 MG/DL (ref 40–60)
LDL CHOLESTEROL CALCULATED: 97 MG/DL
MICROALBUMIN/CREAT 24H UR: 30 MG/G{CREAT}
MICROALBUMIN/CREAT UR-RTO: 30
POTASSIUM SERPL-SCNC: 4.4 MMOL/L (ref 3.5–5.1)
SODIUM BLD-SCNC: 136 MMOL/L (ref 136–145)
TOTAL PROTEIN: 6.8 G/DL (ref 6.4–8.2)
TRIGL SERPL-MCNC: 208 MG/DL (ref 0–150)
VLDLC SERPL CALC-MCNC: 42 MG/DL

## 2020-06-23 PROCEDURE — 83036 HEMOGLOBIN GLYCOSYLATED A1C: CPT | Performed by: NURSE PRACTITIONER

## 2020-06-23 PROCEDURE — 82044 UR ALBUMIN SEMIQUANTITATIVE: CPT | Performed by: NURSE PRACTITIONER

## 2020-06-23 PROCEDURE — 99213 OFFICE O/P EST LOW 20 MIN: CPT | Performed by: NURSE PRACTITIONER

## 2020-06-23 RX ORDER — CYCLOBENZAPRINE HCL 10 MG
10 TABLET ORAL 3 TIMES DAILY PRN
Qty: 21 TABLET | Refills: 0 | Status: SHIPPED | OUTPATIENT
Start: 2020-06-23 | End: 2020-07-03

## 2020-06-23 RX ORDER — SITAGLIPTIN AND METFORMIN HYDROCHLORIDE 500; 50 MG/1; MG/1
1 TABLET, FILM COATED ORAL 2 TIMES DAILY WITH MEALS
Qty: 180 TABLET | Refills: 0 | Status: SHIPPED | OUTPATIENT
Start: 2020-06-23 | End: 2020-09-22

## 2020-06-23 ASSESSMENT — PATIENT HEALTH QUESTIONNAIRE - PHQ9
SUM OF ALL RESPONSES TO PHQ QUESTIONS 1-9: 0
1. LITTLE INTEREST OR PLEASURE IN DOING THINGS: 0
SUM OF ALL RESPONSES TO PHQ9 QUESTIONS 1 & 2: 0
SUM OF ALL RESPONSES TO PHQ QUESTIONS 1-9: 0
2. FEELING DOWN, DEPRESSED OR HOPELESS: 0

## 2020-06-23 NOTE — PROGRESS NOTES
lipid     2020     Amelia Pereira (:  1960) is a 56 y.o. male, here for evaluation of the following medical concerns:    HPI   Chief Complaint   Patient presents with    Diabetes     ROUTINE DM FOLLOW UP WITH A1C- HE IS FASTING TODAY IF NEEDS OTHER BLOODWORK. LIPIDS LAST DONE IN AUGUST. Treatment Adherence:   Medication compliance:  compliant all of the time  Diet compliance:  noncompliant: NOT [place]  Weight trend: increasing  Current exercise: no regular exercise DUE TO COVID - BUT VERY ACTIVE  AT WORK  Barriers: none    Diabetes Mellitus Type 2: Current symptoms/problems include none. Home blood sugar records: patient does not test  Any episodes of hypoglycemia? no  Eye exam current (within one year): yes  [facility]   Tobacco history: He  reports that he has never smoked. He has never used smokeless tobacco.   Daily Aspirin? No: HE WILL TAKE 81 MG    Hypertension:  Home blood pressure monitoring: No.IT IS USUALLY PRETTY GOOD LAST CHECKED IT 3/20  He is adherent to a low sodium diet. Patient denies chest pain, shortness of breath, headache, lightheadedness, blurred vision, peripheral edema, palpitations, dry cough and fatigue. Antihypertensive medication side effects: no medication side effects noted. Use of agents associated with hypertension: none. Hyperlipidemia:  No new myalgias or GI upset on pravastatin (Pravachol).          PT STATES HE HAD A FALL THIS WEEKEND SLIPPED ON HARDWOOD FLOORAND FELL ON RIGHT SIDE HURTING HIS RIBS- NO BRUISING NOTED - TENDER TO TOUCH - ACHING PAIN NOTED WHEN BREATHING- HE RATES THE PAIN /DISCOMFORT  7/10 HE HAS NOT TRIED ANYTHING    Lab Results   Component Value Date    LABA1C 6.9 2019    LABA1C 7.4 2019    LABA1C 7.4 2018     Lab Results   Component Value Date    LABMICR <1.20 2018    CREATININE 0.8 2019       Lab Results   Component Value Date    CHOL 199 2019    TRIG

## 2020-06-23 NOTE — PATIENT INSTRUCTIONS
the plate format. Talk to your doctor, a dietitian, or a diabetes educator about your concerns. Carbohydrate counting  With carbohydrate counting, you plan meals based on the amount of carbohydrate in each food. Carbohydrate raises blood sugar higher and more quickly than any other nutrient. It is found in desserts, breads and cereals, and fruit. It's also found in starchy vegetables such as potatoes and corn, grains such as rice and pasta, and milk and yogurt. Spreading carbohydrate throughout the day helps keep your blood sugar levels within your target range. Your daily amount depends on several things, including your weight, how active you are, which diabetes medicines you take, and what your goals are for your blood sugar levels. A registered dietitian or diabetes educator can help you plan how much carbohydrate to include in each meal and snack. A guideline for your daily amount of carbohydrate is:  · 45 to 60 grams at each meal. That's about the same as 3 to 4 carbohydrate servings. · 15 to 20 grams at each snack. That's about the same as 1 carbohydrate serving. The Nutrition Facts label on packaged foods tells you how much carbohydrate is in a serving of the food. First, look at the serving size on the food label. Is that the amount you eat in a serving? All of the nutrition information on a food label is based on that serving size. So if you eat more or less than that, you'll need to adjust the other numbers. Total carbohydrate is the next thing you need to look for on the label. If you count carbohydrate servings, one serving of carbohydrate is 15 grams. For foods that don't come with labels, such as fresh fruits and vegetables, you'll need a guide that lists carbohydrate in these foods. Ask your doctor, dietitian, or diabetes educator about books or other nutrition guides you can use.   If you take insulin, you need to know how many grams of carbohydrate are in a meal. This lets you know how much This will help lower your blood sugar. What else should you know? · Limit saturated fat, such as the fat from meat and dairy products. This is a healthy choice because people who have diabetes are at higher risk of heart disease. So choose lean cuts of meat and nonfat or low-fat dairy products. Use olive or canola oil instead of butter or shortening when cooking. · Don't skip meals. Your blood sugar may drop too low if you skip meals and take insulin or certain medicines for diabetes. · Check with your doctor before you drink alcohol. Alcohol can cause your blood sugar to drop too low. Alcohol can also cause a bad reaction if you take certain diabetes medicines. Follow-up care is a key part of your treatment and safety. Be sure to make and go to all appointments, and call your doctor if you are having problems. It's also a good idea to know your test results and keep a list of the medicines you take. Where can you learn more? Go to https://Overwatch.BoosterMedia. org and sign in to your [employer] account. Enter X321 in the HeyLets box to learn more about \"Learning About Diabetes Food Guidelines. \"     If you do not have an account, please click on the \"Sign Up Now\" link. Current as of: December 20, 2019               Content Version: 12.5  © 0018-2113 Healthwise, Incorporated. Care instructions adapted under license by [facility]. If you have questions about a medical condition or this instruction, always ask your healthcare professional. [place] [address] any warranty or liability for your use of this information. Patient Education        DASH Diet: Care Instructions  Your Care Instructions     The DASH diet is an eating plan that can help lower your blood pressure. DASH stands for Dietary Approaches to Stop Hypertension. Hypertension is high blood pressure. The DASH diet focuses on eating foods that are high in calcium, potassium, and magnesium.  These nutrients can lower blood pressure. The foods that are highest in these nutrients are fruits, vegetables, low-fat dairy products, nuts, seeds, and legumes. But taking calcium, potassium, and magnesium supplements instead of eating foods that are high in those nutrients does not have the same effect. The DASH diet also includes whole grains, fish, and poultry. The DASH diet is one of several lifestyle changes your doctor may recommend to lower your high blood pressure. Your doctor may also want you to decrease the amount of sodium in your diet. Lowering sodium while following the DASH diet can lower blood pressure even further than just the DASH diet alone. Follow-up care is a key part of your treatment and safety. Be sure to make and go to all appointments, and call your doctor if you are having problems. It's also a good idea to know your test results and keep a list of the medicines you take. How can you care for yourself at home? Following the DASH diet  · Eat 4 to 5 servings of fruit each day. A serving is 1 medium-sized piece of fruit, ½ cup chopped or canned fruit, 1/4 cup dried fruit, or 4 ounces (½ cup) of fruit juice. Choose fruit more often than fruit juice. · Eat 4 to 5 servings of vegetables each day. A serving is 1 cup of lettuce or raw leafy vegetables, ½ cup of chopped or cooked vegetables, or 4 ounces (½ cup) of vegetable juice. Choose vegetables more often than vegetable juice. · Get 2 to 3 servings of low-fat and fat-free dairy each day. A serving is 8 ounces of milk, 1 cup of yogurt, or 1 ½ ounces of cheese. · Eat 6 to 8 servings of grains each day. A serving is 1 slice of bread, 1 ounce of dry cereal, or ½ cup of cooked rice, pasta, or cooked cereal. Try to choose whole-grain products as much as possible. · Limit lean meat, poultry, and fish to 2 servings each day. A serving is 3 ounces, about the size of a deck of cards.   · Eat 4 to 5 servings of nuts, seeds, and legumes (cooked dried 9069-4999 Healthwise, Incorporated. Care instructions adapted under license by [facility] If you have questions about a medical condition or this instruction, always ask your healthcare professional. [address] any warranty or liability for your use of this information. Patient Education        Bruised Rib: Care Instructions  Overview     You can get a bruised rib if you fall or get hit, such as in an accident or while playing sports. The medical term for a bruise is \"contusion. \" Small blood vessels get torn and leak blood under the skin. Most people think of a bruise as a black-and-blue area. But bones and muscles can also get bruised. An injury may damage the rib but not cause a bruise that you can see. Sometimes it can be hard to tell if a rib is bruised or broken. The symptoms may be the same. And a broken bone can't always be seen on an X-ray. But the treatment for a bruised rib is often the same as treatment for a broken one. An injury to the ribs can cause pain. The pain may be worse when you breathe deeply, cough, or sneeze. In most cases, a bruised rib will heal on its own. You can take pain medicine while the rib mends. Pain relief allows you to take deep breaths. Follow-up care is a key part of your treatment and safety. Be sure to make and go to all appointments, and call your doctor if you are having problems. It's also a good idea to know your test results and keep a list of the medicines you take. How can you care for yourself at home? · Rest and protect the injured or sore area. Stop, change, or take a break from any activity that causes pain. · Put ice or a cold pack on the area for 10 to 20 minutes at a time. Put a thin cloth between the ice and your skin. · After 2 or 3 days, if your swelling is gone, put a heating pad set on low or a warm cloth on your chest. Some doctors suggest that you go back and forth between hot and cold.  Put a thin cloth between the heating pad and your skin. · Ask your doctor if you can take an over-the-counter pain medicine, such as acetaminophen (Tylenol), ibuprofen (Advil, Motrin), or naproxen (Aleve). Be safe with medicines. Read and follow all instructions on the label. · As your pain gets better, slowly return to your normal activities. Be patient. Rib bruises can take weeks or months to heal. If the pain gets worse, it may be a sign that you need to rest a while longer. When should you call for help? Call 911 anytime you think you may need emergency care. For example, call if:  · You have severe trouble breathing. Call your doctor now or seek immediate medical care if:  · You have trouble breathing. · You have a fever. · You have a new or worse cough. · You have new or worse pain. Watch closely for changes in your health, and be sure to contact your doctor if:  · You do not get better as expected. Where can you learn more? Go to https://AudioCure Pharma.Spaseebo. org and sign in to your ONOFFMIX (?????) account. Enter R125 in the [employer] box to learn more about \"Bruised Rib: Care Instructions. \"     If you do not have an account, please click on the \"Sign Up Now\" link. Current as of: June 26, 2019               Content Version: 12.5  © 1192-3887 Healthwise, Incorporated. Care instructions adapted under license by [facility] ([facility]). If you have questions about a medical condition or this instruction, always ask your healthcare professional. [place] [address] any warranty or liability for your use of this information.

## 2020-06-25 ENCOUNTER — TELEPHONE (OUTPATIENT)
Dept: FAMILY MEDICINE CLINIC | Age: 60
End: 2020-06-25

## 2020-09-22 RX ORDER — SITAGLIPTIN AND METFORMIN HYDROCHLORIDE 500; 50 MG/1; MG/1
TABLET, FILM COATED ORAL
Qty: 180 TABLET | Refills: 3 | Status: SHIPPED | OUTPATIENT
Start: 2020-09-22 | End: 2021-10-14 | Stop reason: SDUPTHER

## 2020-09-22 NOTE — TELEPHONE ENCOUNTER
CALLED PT AND ADVISED HE NEEDS TO BE SEEN, HE WOULD REALLY LIKE TO SEE DRSEDA ASKING TO WAIT UNTIL FIRST OF November TO SEE HIM HAS SOME OTHER THINGS HE WOULD LIKE TO SPEAK WITH HIM ABOUT.KW [address] 11/6

## 2020-10-12 RX ORDER — SILDENAFIL 100 MG/1
TABLET, FILM COATED ORAL
Qty: 6 TABLET | Refills: 5 | Status: SHIPPED | OUTPATIENT
Start: 2020-10-12 | End: 2020-11-06 | Stop reason: SDUPTHER

## 2020-11-06 ENCOUNTER — VIRTUAL VISIT (OUTPATIENT)
Dept: FAMILY MEDICINE CLINIC | Age: 60
End: 2020-11-06
Payer: COMMERCIAL

## 2020-11-06 LAB
A/G RATIO: 1.7 (ref 1.1–2.2)
ALBUMIN SERPL-MCNC: 4.2 G/DL (ref 3.4–5)
ALP BLD-CCNC: 87 U/L (ref 40–129)
ALT SERPL-CCNC: 14 U/L (ref 10–40)
ANION GAP SERPL CALCULATED.3IONS-SCNC: 10 MMOL/L (ref 3–16)
AST SERPL-CCNC: 13 U/L (ref 15–37)
BILIRUB SERPL-MCNC: 0.9 MG/DL (ref 0–1)
BUN BLDV-MCNC: 14 MG/DL (ref 7–20)
CALCIUM SERPL-MCNC: 9.2 MG/DL (ref 8.3–10.6)
CHLORIDE BLD-SCNC: 96 MMOL/L (ref 99–110)
CHOLESTEROL, TOTAL: 184 MG/DL (ref 0–199)
CO2: 27 MMOL/L (ref 21–32)
CREAT SERPL-MCNC: 0.7 MG/DL (ref 0.8–1.3)
GFR AFRICAN AMERICAN: >60
GFR NON-AFRICAN AMERICAN: >60
GLOBULIN: 2.5 G/DL
GLUCOSE BLD-MCNC: 258 MG/DL (ref 70–99)
HDLC SERPL-MCNC: 41 MG/DL (ref 40–60)
LDL CHOLESTEROL CALCULATED: 102 MG/DL
POTASSIUM SERPL-SCNC: 5 MMOL/L (ref 3.5–5.1)
SODIUM BLD-SCNC: 133 MMOL/L (ref 136–145)
TOTAL PROTEIN: 6.7 G/DL (ref 6.4–8.2)
TRIGL SERPL-MCNC: 206 MG/DL (ref 0–150)
VLDLC SERPL CALC-MCNC: 41 MG/DL

## 2020-11-06 PROCEDURE — 36415 COLL VENOUS BLD VENIPUNCTURE: CPT | Performed by: FAMILY MEDICINE

## 2020-11-06 PROCEDURE — 99214 OFFICE O/P EST MOD 30 MIN: CPT | Performed by: FAMILY MEDICINE

## 2020-11-06 RX ORDER — SILDENAFIL 100 MG/1
TABLET, FILM COATED ORAL
Qty: 6 TABLET | Refills: 5 | Status: SHIPPED | OUTPATIENT
Start: 2020-11-06 | End: 2021-11-18 | Stop reason: ALTCHOICE

## 2020-11-06 NOTE — PROGRESS NOTES
2020    TELEHEALTH EVALUATION -- Audio/Visual (During [facility]- public health emergency)    HPI:    Paul Colin (:  1960) has requested an audio/video evaluation for the following concern(s):    Chief Complaint   Patient presents with    Diabetes     DM ROUTINE FOLLOW UP      BP Readings from Last 3 Encounters:   20 132/78   19 120/74   19 114/72     Pulse Readings from Last 3 Encounters:   20 82   19 70   19 82     Wt Readings from Last 3 Encounters:   19 242 lb (109.8 kg)   19 240 lb (108.9 kg)   18 240 lb 3.2 oz (109 kg)     Neuropathy sx no -   No cp/palp/ dizzy  Urinary frequency normal no other bph sxs  Had prostate exam normal in past.  Fasting for labs  Would just like check labs  A little depressed about election  Doing well. No resp issues. Feels good  Diet/ activity about the same but home more  Not travelling like does w/ work usually - overall activity somewhat less  Review of Systems  Needs refill on viagra - works well - doesn't use too often  O/w ok on refills    Prior to Visit Medications    Medication Sig Taking?  Authorizing Provider   sildenafil (VIAGRA) 100 MG tablet TAKE 1/2-1 TABLET BY MOUTH AS NEEDED FOR ERECTILE DYSFUNCTION Yes PRAVIN Byers - CNP   JANUMET  MG per tablet TAKE 1 TABLET TWICE A DAY WITH MEALS Yes PRAVIN Rivero - CNP   diclofenac (VOLTAREN) 50 MG EC tablet Take 1 tablet by mouth 3 times daily (with meals) Yes PRAVIN Bear CNP   sertraline (ZOLOFT) 50 MG tablet TAKE 1 TABLET DAILY Yes Ena Ortiz MD   losartan (COZAAR) 100 MG tablet TAKE 1 TABLET DAILY Yes PRAVIN Kelley CNP   pravastatin (PRAVACHOL) 40 MG tablet Take 1 tablet by mouth every evening Yes Ena Ortiz MD       Social History     Tobacco Use    Smoking status: Never Smoker    Smokeless tobacco: Never Used    Tobacco comment: Not to start smoking   Substance Use Topics    Alcohol use: Yes     Comment: socially    Drug use: No            PHYSICAL EXAMINATION:  [ INSTRUCTIONS:  \"[x]\" Indicates a positive item  \"[]\" Indicates a negative item  -- DELETE ALL ITEMS NOT EXAMINED]  Vital Signs: (As obtained by patient/caregiver or practitioner observation)    Blood pressure-  Heart rate-    Respiratory rate-    Temperature-  Pulse oximetry-     Constitutional: [x] Appears well-developed and well-nourished [] No apparent distress      [] Abnormal-   Mental status  [x] Alert and awake  [] Oriented to person/place/time []Able to follow commands      Eyes:  EOM    []  Normal  [] Abnormal-  Sclera  []  Normal  [] Abnormal -         Discharge []  None visible  [] Abnormal -    HENT:   [x] Normocephalic, atraumatic. [] Abnormal   [] Mouth/Throat: Mucous membranes are moist.     External Ears [] Normal  [] Abnormal-     Neck: [] No visualized mass     Pulmonary/Chest: [x] Respiratory effort normal.  [] No visualized signs of difficulty breathing or respiratory distress        [] Abnormal-      Musculoskeletal:   [] Normal gait with no signs of ataxia         [] Normal range of motion of neck        [] Abnormal-       Neurological:        [x] No Facial Asymmetry (Cranial nerve 7 motor function) (limited exam to video visit)          [] No gaze palsy        [] Abnormal-         Skin:        [x] No significant exanthematous lesions or discoloration noted on facial skin         [] Abnormal-            Psychiatric:       [x] Normal Affect [] No Hallucinations        [] Abnormal-     Other pertinent observable physical exam findings-     ASSESSMENT/PLAN:  There are no diagnoses linked to this encounter. Diagnosis Orders   1. Type 2 diabetes mellitus without complication, without long-term current use of insulin ([address]     2. Mixed hyperlipidemia     3. Essential hypertension     4.  Corporo-venous occlusive erectile dysfunction       Refill viagra prn - tolerates well  Monitor bp  F/u pending lipid,cmp,a1c  Pt declines psa screen as low last year and no fam hx of prostate ca - d/w pt JAELYN - consider if sxs  cpm pending labs  Diet/ activity d/w pt  Feet care  Annual eye exam  Flu shot encouraged  Last colonoscopy 12 years ago - consider fit test this year  No follow-ups on file. Elaine Casarez is a 56 y.o. male being evaluated by a Virtual Visit (video visit) encounter to address concerns as mentioned above. A caregiver was present when appropriate. Due to this being a TeleHealth encounter (During EQILG-53 public health emergency), evaluation of the following organ systems was limited: Vitals/Constitutional/EENT/Resp/CV/GI//MS/Neuro/Skin/Heme-Lymph-Imm. Pursuant to the emergency declaration under the [address] [place] [address], [address] [place] [address] authority and the [employer] Act, this Virtual Visit was conducted with patient's (and/or legal guardian's) consent, to reduce the patient's risk of exposure to COVID-19 and provide necessary medical care. The patient (and/or legal guardian) has also been advised to contact this office for worsening conditions or problems, and seek emergency medical treatment and/or call 911 if deemed necessary. Patient identification was verified at the start of the visit: Yes    Total time spent on this encounter: 21-30 minutes    Services were provided through a video synchronous discussion virtually to substitute for in-person clinic visit. Patient and provider were located at their individual homes. --Jaja Paredes MD on 11/6/2020 at 8:59 AM    An electronic signature was used to authenticate this note.

## 2020-11-07 LAB
ESTIMATED AVERAGE GLUCOSE: 151.3 MG/DL
HBA1C MFR BLD: 6.9 %

## 2020-12-14 RX ORDER — LOSARTAN POTASSIUM 100 MG/1
TABLET ORAL
Qty: 90 TABLET | Refills: 1 | Status: SHIPPED | OUTPATIENT
Start: 2020-12-14 | End: 2021-06-11

## 2021-06-08 ENCOUNTER — TELEPHONE (OUTPATIENT)
Dept: FAMILY MEDICINE CLINIC | Age: 61
End: 2021-06-08

## 2021-06-08 NOTE — TELEPHONE ENCOUNTER
DR PICKETT WOULD YOU LIKE PATIENT TO COME IN FOR APPOINTMENT OR BLOOD WORK BEFORE VACATION ?  LAST VV WITH NIEVES 11/6/2020 AND  LAST A1C 11/6/2020

## 2021-06-08 NOTE — TELEPHONE ENCOUNTER
[phone] SPOKE WITH PATIENT AND HE WILL COME IN FIRST THING TOMORROW TO GET POCT A1C     PATIENT LEAVES IN THE AFTERNOON FOR VACATION AND WOULD LIKE DIRECTIONS BEFORE THEN. NOTED ON APPOINTMENT TO GIVE DR PICKETT RESULTS ASAP.      Future Appointments   Date Time Provider Karen Lee   6/9/2021  8:00 AM SCHEDULE, [facility] Torres SALGADO

## 2021-06-08 NOTE — TELEPHONE ENCOUNTER
If possible, come in first thing in am to check a1c to help guide treatment decisions.   Will definitely need to hold off on desserts, pastries, white flour products ,etc.

## 2021-06-08 NOTE — TELEPHONE ENCOUNTER
Patient tested his blood glucose this AM fasting and no meds he was 343. They are getting ready to leave on vacation for 2 weeks and now are concerned. He had not been checking his glucose at all. Last time was checked was at the hospital when he had his colonoscopy , it was in the 200's.

## 2021-06-09 ENCOUNTER — TELEPHONE (OUTPATIENT)
Dept: FAMILY MEDICINE CLINIC | Age: 61
End: 2021-06-09

## 2021-06-09 ENCOUNTER — NURSE ONLY (OUTPATIENT)
Dept: FAMILY MEDICINE CLINIC | Age: 61
End: 2021-06-09
Payer: COMMERCIAL

## 2021-06-09 DIAGNOSIS — E11.9 TYPE 2 DIABETES MELLITUS WITHOUT COMPLICATION, WITHOUT LONG-TERM CURRENT USE OF INSULIN (HCC): Primary | ICD-10-CM

## 2021-06-09 DIAGNOSIS — E87.1 LOW SODIUM LEVELS: ICD-10-CM

## 2021-06-09 LAB
A/G RATIO: 2.1 (ref 1.1–2.2)
ALBUMIN SERPL-MCNC: 4.5 G/DL (ref 3.4–5)
ALP BLD-CCNC: 81 U/L (ref 40–129)
ALT SERPL-CCNC: 10 U/L (ref 10–40)
ANION GAP SERPL CALCULATED.3IONS-SCNC: 10 MMOL/L (ref 3–16)
AST SERPL-CCNC: 15 U/L (ref 15–37)
BILIRUB SERPL-MCNC: 1 MG/DL (ref 0–1)
BUN BLDV-MCNC: 13 MG/DL (ref 7–20)
CALCIUM SERPL-MCNC: 8.9 MG/DL (ref 8.3–10.6)
CHLORIDE BLD-SCNC: 97 MMOL/L (ref 99–110)
CO2: 25 MMOL/L (ref 21–32)
CREAT SERPL-MCNC: 0.8 MG/DL (ref 0.8–1.3)
GFR AFRICAN AMERICAN: >60
GFR NON-AFRICAN AMERICAN: >60
GLOBULIN: 2.1 G/DL
GLUCOSE BLD-MCNC: 231 MG/DL (ref 70–99)
HBA1C MFR BLD: 7.4 %
POTASSIUM SERPL-SCNC: 4.8 MMOL/L (ref 3.5–5.1)
SODIUM BLD-SCNC: 132 MMOL/L (ref 136–145)
TOTAL PROTEIN: 6.6 G/DL (ref 6.4–8.2)

## 2021-06-09 PROCEDURE — 83036 HEMOGLOBIN GLYCOSYLATED A1C: CPT | Performed by: FAMILY MEDICINE

## 2021-06-09 PROCEDURE — 36415 COLL VENOUS BLD VENIPUNCTURE: CPT | Performed by: FAMILY MEDICINE

## 2021-06-09 RX ORDER — PIOGLITAZONEHYDROCHLORIDE 30 MG/1
30 TABLET ORAL DAILY
Qty: 30 TABLET | Refills: 3 | Status: SHIPPED | OUTPATIENT
Start: 2021-06-09 | End: 2021-10-07

## 2021-06-09 NOTE — TELEPHONE ENCOUNTER
Patient came in today and had blood work, but they are leaving to go on vacation today at 3 pm, she has questions concerning the medication. Is he going to have a drop in his sugar? Please give her a call back, or should they hold off until they get back?

## 2021-06-09 NOTE — TELEPHONE ENCOUNTER
Spoke to pt and informed him about his medication and discussed diet change.  Scheduled him for his 3 month f/u

## 2021-06-11 RX ORDER — LOSARTAN POTASSIUM 100 MG/1
TABLET ORAL
Qty: 90 TABLET | Refills: 0 | Status: SHIPPED | OUTPATIENT
Start: 2021-06-11 | End: 2021-09-09

## 2021-10-07 RX ORDER — PIOGLITAZONEHYDROCHLORIDE 30 MG/1
30 TABLET ORAL DAILY
Qty: 30 TABLET | Refills: 0 | Status: SHIPPED | OUTPATIENT
Start: 2021-10-07 | End: 2021-11-08

## 2021-10-07 NOTE — TELEPHONE ENCOUNTER
ATTEMPTED TO CONTACT PATIENT, PHONE RANG A FEW TIMES. THEN MESSAGE STATES TO PLEASE ENTER REMOTE ACCESS CODE.  SC

## 2021-10-12 NOTE — TELEPHONE ENCOUNTER
WE HAVE TRIED NUMEROUS TIMES TO REACH PATIENT BY PHONE. CLOSING MESSAGE FOR NOW AND WILL ADDRESS WHEN PATIENT CALLS BACK.  SC

## 2021-10-14 ENCOUNTER — TELEPHONE (OUTPATIENT)
Dept: FAMILY MEDICINE CLINIC | Age: 61
End: 2021-10-14

## 2021-10-14 RX ORDER — SITAGLIPTIN AND METFORMIN HYDROCHLORIDE 500; 50 MG/1; MG/1
TABLET, FILM COATED ORAL
Qty: 180 TABLET | Refills: 3 | Status: SHIPPED | OUTPATIENT
Start: 2021-10-14 | End: 2021-10-20 | Stop reason: SDUPTHER

## 2021-10-14 NOTE — TELEPHONE ENCOUNTER
ATTEMPTED TO CONTACT PATIENT. NO VOICEMAIL SET UP, MESSAGE ASKED ME TO ENTER MY REMOTE ACCESS CODE.  SC

## 2021-10-14 NOTE — TELEPHONE ENCOUNTER
Patient has a follow up appt scheduled for 11-16-21    He is requesting refills on his janumet and sertraline       Ben Sung, [facility] [phone]

## 2021-10-20 RX ORDER — SITAGLIPTIN AND METFORMIN HYDROCHLORIDE 500; 50 MG/1; MG/1
TABLET, FILM COATED ORAL
Qty: 60 TABLET | Refills: 0 | Status: SHIPPED | OUTPATIENT
Start: 2021-10-20 | End: 2021-11-18 | Stop reason: SDUPTHER

## 2021-10-20 NOTE — TELEPHONE ENCOUNTER
Joann from [facility] is calling to see if we can send an RX to a local pharmacy for pt.   He is out of his medication    Sertraline 50 mg #30  Janumet  #60     Please send to [facility]

## 2021-11-08 RX ORDER — PIOGLITAZONEHYDROCHLORIDE 30 MG/1
30 TABLET ORAL DAILY
Qty: 30 TABLET | Refills: 0 | Status: SHIPPED | OUTPATIENT
Start: 2021-11-08 | End: 2021-11-18 | Stop reason: ALTCHOICE

## 2021-11-18 ENCOUNTER — OFFICE VISIT (OUTPATIENT)
Dept: FAMILY MEDICINE CLINIC | Age: 61
End: 2021-11-18
Payer: COMMERCIAL

## 2021-11-18 VITALS
DIASTOLIC BLOOD PRESSURE: 82 MMHG | BODY MASS INDEX: 31.06 KG/M2 | HEIGHT: 74 IN | RESPIRATION RATE: 16 BRPM | OXYGEN SATURATION: 98 % | HEART RATE: 72 BPM | WEIGHT: 242 LBS | SYSTOLIC BLOOD PRESSURE: 124 MMHG

## 2021-11-18 DIAGNOSIS — Z23 NEEDS FLU SHOT: ICD-10-CM

## 2021-11-18 DIAGNOSIS — E11.9 TYPE 2 DIABETES MELLITUS WITHOUT COMPLICATION, WITHOUT LONG-TERM CURRENT USE OF INSULIN (HCC): ICD-10-CM

## 2021-11-18 DIAGNOSIS — Z12.5 SCREENING FOR MALIGNANT NEOPLASM OF PROSTATE: ICD-10-CM

## 2021-11-18 DIAGNOSIS — N52.02 CORPORO-VENOUS OCCLUSIVE ERECTILE DYSFUNCTION: ICD-10-CM

## 2021-11-18 DIAGNOSIS — Z00.00 ANNUAL VISIT FOR GENERAL ADULT MEDICAL EXAMINATION WITHOUT ABNORMAL FINDINGS: Primary | ICD-10-CM

## 2021-11-18 DIAGNOSIS — F41.1 ANXIETY STATE: ICD-10-CM

## 2021-11-18 DIAGNOSIS — I10 PRIMARY HYPERTENSION: ICD-10-CM

## 2021-11-18 DIAGNOSIS — E78.00 PURE HYPERCHOLESTEROLEMIA: ICD-10-CM

## 2021-11-18 LAB
A/G RATIO: 2.2 (ref 1.1–2.2)
ALBUMIN SERPL-MCNC: 4.8 G/DL (ref 3.4–5)
ALP BLD-CCNC: 73 U/L (ref 40–129)
ALT SERPL-CCNC: 11 U/L (ref 10–40)
ANION GAP SERPL CALCULATED.3IONS-SCNC: 13 MMOL/L (ref 3–16)
AST SERPL-CCNC: 13 U/L (ref 15–37)
BILIRUB SERPL-MCNC: 0.8 MG/DL (ref 0–1)
BUN BLDV-MCNC: 12 MG/DL (ref 7–20)
CALCIUM SERPL-MCNC: 9.4 MG/DL (ref 8.3–10.6)
CHLORIDE BLD-SCNC: 101 MMOL/L (ref 99–110)
CHOLESTEROL, TOTAL: 204 MG/DL (ref 0–199)
CO2: 24 MMOL/L (ref 21–32)
CREAT SERPL-MCNC: 0.8 MG/DL (ref 0.8–1.3)
CREATININE URINE POCT: 300
GFR AFRICAN AMERICAN: >60
GFR NON-AFRICAN AMERICAN: >60
GLUCOSE BLD-MCNC: 113 MG/DL (ref 70–99)
HBA1C MFR BLD: 4.8 %
HDLC SERPL-MCNC: 54 MG/DL (ref 40–60)
LDL CHOLESTEROL CALCULATED: 121 MG/DL
MICROALBUMIN/CREAT 24H UR: 10 MG/G{CREAT}
MICROALBUMIN/CREAT UR-RTO: <30
POTASSIUM SERPL-SCNC: 4.7 MMOL/L (ref 3.5–5.1)
PROSTATE SPECIFIC ANTIGEN: 0.74 NG/ML (ref 0–4)
SODIUM BLD-SCNC: 138 MMOL/L (ref 136–145)
TOTAL PROTEIN: 7 G/DL (ref 6.4–8.2)
TRIGL SERPL-MCNC: 147 MG/DL (ref 0–150)
VLDLC SERPL CALC-MCNC: 29 MG/DL

## 2021-11-18 PROCEDURE — 90674 CCIIV4 VAC NO PRSV 0.5 ML IM: CPT | Performed by: NURSE PRACTITIONER

## 2021-11-18 PROCEDURE — 36415 COLL VENOUS BLD VENIPUNCTURE: CPT | Performed by: NURSE PRACTITIONER

## 2021-11-18 PROCEDURE — 82044 UR ALBUMIN SEMIQUANTITATIVE: CPT | Performed by: NURSE PRACTITIONER

## 2021-11-18 PROCEDURE — 90471 IMMUNIZATION ADMIN: CPT | Performed by: NURSE PRACTITIONER

## 2021-11-18 PROCEDURE — 99396 PREV VISIT EST AGE 40-64: CPT | Performed by: NURSE PRACTITIONER

## 2021-11-18 PROCEDURE — 83036 HEMOGLOBIN GLYCOSYLATED A1C: CPT | Performed by: NURSE PRACTITIONER

## 2021-11-18 RX ORDER — SITAGLIPTIN AND METFORMIN HYDROCHLORIDE 500; 50 MG/1; MG/1
TABLET, FILM COATED ORAL
Qty: 60 TABLET | Refills: 5 | Status: SHIPPED | OUTPATIENT
Start: 2021-11-18 | End: 2022-10-07

## 2021-11-18 RX ORDER — PRAVASTATIN SODIUM 40 MG
40 TABLET ORAL EVERY EVENING
Qty: 30 TABLET | Refills: 5 | Status: SHIPPED | OUTPATIENT
Start: 2021-11-18 | End: 2021-11-22 | Stop reason: DRUGHIGH

## 2021-11-18 RX ORDER — TADALAFIL 10 MG/1
10 TABLET ORAL DAILY PRN
Qty: 30 TABLET | Refills: 1 | Status: SHIPPED | OUTPATIENT
Start: 2021-11-18 | End: 2022-05-25 | Stop reason: SDUPTHER

## 2021-11-18 ASSESSMENT — PATIENT HEALTH QUESTIONNAIRE - PHQ9
2. FEELING DOWN, DEPRESSED OR HOPELESS: 0
SUM OF ALL RESPONSES TO PHQ QUESTIONS 1-9: 0
SUM OF ALL RESPONSES TO PHQ9 QUESTIONS 1 & 2: 0
SUM OF ALL RESPONSES TO PHQ QUESTIONS 1-9: 0
1. LITTLE INTEREST OR PLEASURE IN DOING THINGS: 0
SUM OF ALL RESPONSES TO PHQ QUESTIONS 1-9: 0

## 2021-11-18 ASSESSMENT — ENCOUNTER SYMPTOMS
DIARRHEA: 0
SORE THROAT: 0
ABDOMINAL DISTENTION: 0
SHORTNESS OF BREATH: 0
EYE DISCHARGE: 0
VOMITING: 0
WHEEZING: 0
COUGH: 0
NAUSEA: 0
ABDOMINAL PAIN: 0
SINUS PAIN: 0
SINUS PRESSURE: 0
EYE REDNESS: 0
EYE PAIN: 0

## 2021-11-18 NOTE — PROGRESS NOTES
Vaccine Information Sheet, \"Influenza - Inactivated\"  given to Gauri Gonzalez, or parent/legal guardian of  Gauri Gonzalez and verbalized understanding. Patient responses:    Have you ever had a reaction to a flu vaccine? No  Do you have any current illness? No  Have you ever had [place]illian [place] Syndrome? No  Do you have a serious allergy to any of the follow: Neomycin, Polymyxin, Thimerosal, eggs or egg products? No    Flu vaccine given per order. Please see immunization tab. Risks and benefits explained. Current VIS given. Consent signed.     Immunizations Administered     Name Date Dose Route    Influenza, MDCK Quadv, IM, PF (Flucelvax 2 yrs and older) 11/18/2021 0.5 mL Intramuscular    Site: Deltoid- Left    Lot: [id number]    NDC: 16256-408-78

## 2021-11-18 NOTE — PROGRESS NOTES
50 MG tablet; 1 po daily, Disp-30 tablet, R-5Normal  8. Screening for malignant neoplasm of prostate  -     PSA screening; Future      Return in about 6 months (around 5/18/2022) for Follow-up diabetes. SUBJECTIVE/OBJECTIVE:  SHANAE Strange presents today for annual physical as well as diabetes follow-up. Denies any concerns today. He is fasting for labs. He states that since starting the Actos, sugars have been much better. He had contacted the office in June to the elevated sugars. He notes that that time he had almost ran out medication. He had been taking Janumet once a day as opposed to twice a day. He has since increased the Janumet to twice a day and added the Actos. He states that sugars have been much better. He checks daily. Consistently below 140. Denies any increased urination, increased thirst, increased hunger, abdominal pain, nausea. He has been trying to watch his carbs. Staying active. He states he gets his eyes checked once a year at [facility] in [place] with Mindy. Almaz Strange continues to take losartan for blood pressure. Checks his blood pressure at home occasionally. Typically in normal range. He is also taking pravastatin without issue. He had been taking sildenafil as needed for erectile dysfunction. He notes that it does not work as well as he thinks it should. Has not been taking it too often. He is wondering if there is another option. He would like to get his flu shot today. Review of Systems   Constitutional: Negative for chills and fever. HENT: Negative for ear discharge, ear pain, hearing loss, sinus pressure, sinus pain and sore throat. Eyes: Negative for pain, discharge and redness. Respiratory: Negative for cough, shortness of breath and wheezing. Cardiovascular: Negative for chest pain and palpitations. Gastrointestinal: Negative for abdominal distention, abdominal pain, diarrhea, nausea and vomiting.    Genitourinary: Negative for dysuria and hematuria. Musculoskeletal: Negative for myalgias. Skin: Negative for rash. Neurological: Negative for dizziness, weakness, light-headedness, numbness and headaches. Psychiatric/Behavioral: Negative for decreased concentration, dysphoric mood and sleep disturbance. The patient is not nervous/anxious. Physical Exam  Vitals reviewed. Constitutional:       General: He is not in acute distress. Appearance: Normal appearance. He is obese. HENT:      Head: Normocephalic and atraumatic. Right Ear: Tympanic membrane, ear canal and external ear normal.      Left Ear: Tympanic membrane, ear canal and external ear normal.      Nose: Nose normal.      Mouth/Throat:      Mouth: Mucous membranes are moist.      Pharynx: Oropharynx is clear. No posterior oropharyngeal erythema. Eyes:      General: No scleral icterus. Right eye: No discharge. Left eye: No discharge. Extraocular Movements: Extraocular movements intact. Pupils: Pupils are equal, round, and reactive to light. Neck:      Vascular: No carotid bruit. Cardiovascular:      Rate and Rhythm: Normal rate and regular rhythm. Pulses: Normal pulses. Heart sounds: Normal heart sounds. No murmur heard. No gallop. Pulmonary:      Effort: Pulmonary effort is normal.      Breath sounds: Normal breath sounds. No wheezing. Abdominal:      General: Bowel sounds are normal.      Palpations: Abdomen is soft. Tenderness: There is no abdominal tenderness. There is no guarding or rebound. Musculoskeletal:         General: Normal range of motion. Cervical back: Normal range of motion and neck supple. Lymphadenopathy:      Cervical: No cervical adenopathy. Skin:     General: Skin is warm and dry. Capillary Refill: Capillary refill takes less than 2 seconds. Neurological:      Mental Status: He is alert and oriented to person, place, and time. Mental status is at baseline.    Psychiatric: Mood and Affect: Mood normal.         Behavior: Behavior normal.         Thought Content: Thought content normal.         Judgment: Judgment normal.       Visual inspection:  Deformity/amputation: absent  Skin lesions/pre-ulcerative calluses: absent  Edema: right- negative, left- negative    Sensory exam:  Monofilament sensation: normal  (minimum of 5 random plantar locations tested, avoiding callused areas - > 1 area with absence of sensation is + for neuropathy)    Plus at least one of the following:  Pulses: normal,   Pinprick: N/A  Proprioception: N/A  Vibration (128 Hz): N/A        This dictation was generated by voice recognition computer software. Although all attempts are made to edit the dictation for accuracy, there may be errors in the transcription that are not intended. An electronic signature was used to authenticate this note.     --PRAVIN Aviles - CNP

## 2021-11-22 DIAGNOSIS — E78.00 PURE HYPERCHOLESTEROLEMIA: Primary | ICD-10-CM

## 2021-11-22 RX ORDER — PRAVASTATIN SODIUM 80 MG/1
80 TABLET ORAL DAILY
Qty: 90 TABLET | Refills: 1 | Status: SHIPPED | OUTPATIENT
Start: 2021-11-22 | End: 2021-12-09 | Stop reason: SINTOL

## 2021-11-24 ENCOUNTER — TELEPHONE (OUTPATIENT)
Dept: FAMILY MEDICINE CLINIC | Age: 61
End: 2021-11-24

## 2021-11-24 NOTE — TELEPHONE ENCOUNTER
Risa Trujillo, PRAVIN - BOONE   11/22/2021  9:55 AM EST         Your cholesterol is not at goal of 100 for the LDL. I am going to increase your pravastatin to 80 mg daily. You can take x2 of the 40 mg until you run out. The refills will be for 80 mg tablets, so you will only need to take one at that time. We can recheck your fasting cholesterol at your next appointment.  Everything else was normal.

## 2021-12-08 ENCOUNTER — TELEPHONE (OUTPATIENT)
Dept: FAMILY MEDICINE CLINIC | Age: 61
End: 2021-12-08

## 2021-12-08 NOTE — TELEPHONE ENCOUNTER
Patient called and stated his medication list is wrong. He takes:  Losartan  janumt  Sertraline  Tadalafil  pioglitazone 30mg- he needs a refill. He started this medication in June and its really working.         Does not take:  Pravastatin

## 2021-12-09 ENCOUNTER — TELEPHONE (OUTPATIENT)
Dept: FAMILY MEDICINE CLINIC | Age: 61
End: 2021-12-09

## 2021-12-09 DIAGNOSIS — E11.9 TYPE 2 DIABETES MELLITUS WITHOUT COMPLICATION, WITHOUT LONG-TERM CURRENT USE OF INSULIN (HCC): Primary | ICD-10-CM

## 2021-12-09 RX ORDER — PIOGLITAZONEHYDROCHLORIDE 30 MG/1
30 TABLET ORAL DAILY
COMMUNITY
End: 2021-12-15 | Stop reason: ALTCHOICE

## 2021-12-09 RX ORDER — ROSUVASTATIN CALCIUM 5 MG/1
5 TABLET, COATED ORAL DAILY
Qty: 30 TABLET | Refills: 5 | Status: SHIPPED | OUTPATIENT
Start: 2021-12-09 | End: 2022-06-01 | Stop reason: SDUPTHER

## 2021-12-09 NOTE — TELEPHONE ENCOUNTER
----- Message from Timoteo Castro sent at 12/8/2021  5:27 PM EST -----  Subject: Message to Provider    QUESTIONS  Information for Provider? missed call from office but the office is closed   currently advised that they would call back   ---------------------------------------------------------------------------  --------------  [address]  What is the best way for the office to contact you? OK to leave message on   voicemail  Preferred Call Back Phone Number?  [phone]  ---------------------------------------------------------------------------  --------------  SCRIPT ANSWERS  undefined

## 2021-12-09 NOTE — TELEPHONE ENCOUNTER
SPOKE TO PT AND WIFE AND DISCUSSED WITH DR PICKETT. MED CHANGES. WILL CALL PT TOMORROW WITH ADDITIONAL INFORMATION.   [facility]

## 2021-12-09 NOTE — TELEPHONE ENCOUNTER
[facility]    12/8/21 3:35 PM  Note  Patient called and stated his medication list is wrong.       He takes:  Losartan  janumt  Sertraline  Tadalafil  pioglitazone 30mg- he needs a refill. He started this medication in June and its really working.           Does not take:  Pravastatin                    12/8/21 3:35 PM  Molly Singer routed this conversation to [address] Practice Staff                12/8/21 3:37 PM  Molly Singer routed this conversation to [address] Practice Staff                12/8/21 3:39 PM  Molly Singer routed this conversation to [facility] [address] Staff       Emily Jeronimo, [facility]    12/8/21 5:21 PM  Note  ATTEMPTED TO CONTACT PATIENT, PHONE JUST RANG, UNABLE TO LEAVE MESSAGE. ESSENCE Mejia, APRN - CNP   11/22/2021  9:55 AM EST         Your cholesterol is not at goal of 100 for the LDL. I am going to increase your pravastatin to 80 mg daily. You can take x2 of the 40 mg until you run out. The refills will be for 80 mg tablets, so you will only need to take one at that time. We can recheck your fasting cholesterol at your next appointment.  Everything else was normal.

## 2021-12-10 RX ORDER — BLOOD-GLUCOSE,RECEIVER,CONT
1 EACH MISCELLANEOUS DAILY
Qty: 6 EACH | Refills: 0 | Status: SHIPPED | OUTPATIENT
Start: 2021-12-10 | End: 2022-06-29 | Stop reason: ALTCHOICE

## 2021-12-10 RX ORDER — BLOOD-GLUCOSE SENSOR
1 EACH MISCELLANEOUS
Qty: 6 EACH | Refills: 0 | Status: SHIPPED | OUTPATIENT
Start: 2021-12-10 | End: 2022-06-29 | Stop reason: ALTCHOICE

## 2021-12-10 RX ORDER — BLOOD-GLUCOSE TRANSMITTER
1 EACH MISCELLANEOUS DAILY
Qty: 6 EACH | Refills: 0 | Status: SHIPPED | OUTPATIENT
Start: 2021-12-10 | End: 2022-06-29 | Stop reason: ALTCHOICE

## 2021-12-10 NOTE — TELEPHONE ENCOUNTER
SPOKE TO PT'S WIFE, SENT RX FOR DEXCOM DEVICE AND SUPPLIES AND REFERRAL FOR DIABETES EDUCATION PER DR PICKETT' REQUEST.  PT'S WIFE WILL CHECK WITH INSURANCE CO ABOUT DEXCOM COVERAGE. SHE WILL MONITOR SUGARS CLOSELY THROUGHOUT THE WEEKEND. LAST NIGHT AFTER DINNER HIS BS  AND THEY HELD JANUMET. THIS MORNING HIS FASTING BS . SHE WILL REACH OUT IF THIS WORSENS.   [address] [facility]

## 2021-12-10 NOTE — TELEPHONE ENCOUNTER
AMMON THIS WAS HANDLED IN A DIFFERENT MESSAGE.  [address] [employer] [facility]

## 2021-12-15 NOTE — TELEPHONE ENCOUNTER
SPOKE TO PT'S WIFE, PT'S DEXCOM WILL COST ABOUT $247 TO START ACCORDING TO [facility]. [facility] DOES NOT HAVE ANY IN STOCK. THE WIFE WILL CALL THE INSURANCE CO AND LET ME KNOW WHAT THEY DECIDE.   [address]

## 2021-12-22 ENCOUNTER — TELEPHONE (OUTPATIENT)
Dept: ADMINISTRATIVE | Age: 61
End: 2021-12-22

## 2022-02-10 ENCOUNTER — TELEPHONE (OUTPATIENT)
Dept: FAMILY MEDICINE CLINIC | Age: 62
End: 2022-02-10

## 2022-02-10 NOTE — TELEPHONE ENCOUNTER
This morning pt fell and hit his head. He fell backwards the top of his head hit the ice. Pt feels fine no dizziness, no lightheadedness. He is just sore. Can pt take a nap or should he wait a while to lay down? .    Pt wants to know what to look for if  anything.

## 2022-02-10 NOTE — TELEPHONE ENCOUNTER
Okay to nap but may be good to have someone wake him up after an hour to make sure wakes up okay.   Look for any increased confusion, neurologic changes, speech or vision changes or dizzyness or worsening headache/ nausea / vomiting  If any of these - should go to ER for evaluation o/w tylenol for any headache and hydrate well

## 2022-03-22 ENCOUNTER — OFFICE VISIT (OUTPATIENT)
Dept: FAMILY MEDICINE CLINIC | Age: 62
End: 2022-03-22
Payer: COMMERCIAL

## 2022-03-22 VITALS
DIASTOLIC BLOOD PRESSURE: 78 MMHG | BODY MASS INDEX: 30.54 KG/M2 | OXYGEN SATURATION: 98 % | HEART RATE: 83 BPM | SYSTOLIC BLOOD PRESSURE: 124 MMHG | RESPIRATION RATE: 16 BRPM | TEMPERATURE: 97.5 F | HEIGHT: 74 IN | WEIGHT: 238 LBS

## 2022-03-22 DIAGNOSIS — B35.4 TINEA CORPORIS: Primary | ICD-10-CM

## 2022-03-22 PROCEDURE — 99213 OFFICE O/P EST LOW 20 MIN: CPT | Performed by: NURSE PRACTITIONER

## 2022-03-22 ASSESSMENT — ENCOUNTER SYMPTOMS
NAUSEA: 0
BACK PAIN: 0
COLOR CHANGE: 0
CHEST TIGHTNESS: 0
COUGH: 0
SINUS PAIN: 0
EYE DISCHARGE: 0
SHORTNESS OF BREATH: 0
SINUS PRESSURE: 0
ABDOMINAL DISTENTION: 0
DIARRHEA: 0
CONSTIPATION: 0
ABDOMINAL PAIN: 0

## 2022-03-22 ASSESSMENT — PATIENT HEALTH QUESTIONNAIRE - PHQ9
SUM OF ALL RESPONSES TO PHQ9 QUESTIONS 1 & 2: 0
2. FEELING DOWN, DEPRESSED OR HOPELESS: 0
SUM OF ALL RESPONSES TO PHQ QUESTIONS 1-9: 0
1. LITTLE INTEREST OR PLEASURE IN DOING THINGS: 0
SUM OF ALL RESPONSES TO PHQ QUESTIONS 1-9: 0

## 2022-03-22 NOTE — PROGRESS NOTES
Date of Service:  3/22/2022    Madhavi Mckeon (:  1960) is a 59 y.o. male, here for evaluation of the following medical concerns:    Chief Complaint   Patient presents with    Other     PT C/O SORE ON HIS RIGHT LEG RIGHT BELOW HIS KNEE THAT HE NOTICED ABOUT 3-4 DAYS AGO        HPI     Wound  Pt reports sore on right leg. Pt noticed this about 4 days ago. Pt's wife wants him to be checked out anytime he gets any sort of cut/sore and not infected because he is a diabetic. Going out of town soon and wanted to get it checked out ahead of time. Very well controlled diabetic, last A1C 4.2021. Pt tried a little saave on it, keeping it dry, but otherwise has just left it open to air. Denies itching or burning. Unsure if he bumped it or cut it. No drainage from it. No one with similar rash in household. Thinks maybe wife had rash on chest recently but that cleared on its own. Review of Systems   Constitutional: Negative for activity change, appetite change, fatigue, fever and unexpected weight change. HENT: Negative for congestion, ear pain, sinus pressure and sinus pain. Eyes: Negative for discharge and visual disturbance. Respiratory: Negative for cough, chest tightness and shortness of breath. Cardiovascular: Negative for chest pain, palpitations and leg swelling. Gastrointestinal: Negative for abdominal distention, abdominal pain, constipation, diarrhea and nausea. Endocrine: Negative for cold intolerance, heat intolerance, polydipsia, polyphagia and polyuria. Genitourinary: Negative for decreased urine volume, difficulty urinating, dysuria, flank pain, frequency and urgency. Musculoskeletal: Negative for arthralgias, back pain, gait problem, joint swelling, myalgias and neck pain. Skin: Positive for rash (R medial leg/ankle area) and wound (see image). Negative for color change. Allergic/Immunologic: Negative for food allergies and immunocompromised state.    Neurological: Negative for dizziness, tremors, speech difficulty, weakness, light-headedness, numbness and headaches. Hematological: Negative for adenopathy. Does not bruise/bleed easily. Psychiatric/Behavioral: Negative for confusion, decreased concentration, self-injury, sleep disturbance and suicidal ideas. The patient is not nervous/anxious. Prior to Visit Medications    Medication Sig Taking?  Authorizing Provider   miconazole (MICOTIN) 2 % cream Apply topically 2 times daily x 1 week Yes PRAVIN Venegas CNP   Continuous Blood Gluc Sensor (DEXCOM G6 SENSOR) MISC 1 each by Does not apply route every 14 days Yes Shaaron Lesches, MD   Continuous Blood Gluc Transmit (DEXCOM G6 TRANSMITTER) MISC 1 each by In Vitro route daily Yes Shaaron Lesches, MD   Continuous Blood Gluc  ([address]) [address] 1 each by In Vitro route daily Yes Shaaron Lesches, MD   rosuvastatin (CRESTOR) 5 MG tablet Take 1 tablet by mouth daily Yes Shaaron Lesches, MD   tadalafil (CIALIS) 10 MG tablet Take 1 tablet by mouth daily as needed for Erectile Dysfunction Yes Madisyn Fill, APRN - CNP   sertraline (ZOLOFT) 50 MG tablet 1 po daily Yes [place] Fill, APRN - CNP   JANUMET  MG per tablet TAKE 1 TABLET TWICE A DAY WITH MEALS Yes Madisyn Fill, PRAVIN - CNP   losartan (COZAAR) 100 MG tablet TAKE 1 TABLET DAILY Yes Shaaron Lesches, MD        Social History     Tobacco Use    Smoking status: Never Smoker    Smokeless tobacco: Never Used    Tobacco comment: Not to start smoking   Substance Use Topics    Alcohol use: Yes     Comment: socially        Vitals:    03/22/22 1444   BP: 124/78   Site: Left Upper Arm   Position: Sitting   Cuff Size: Small Adult   Pulse: 83   Resp: 16   Temp: 97.5 °F (36.4 °C)   TempSrc: Infrared   SpO2: 98%   Weight: 238 lb (108 kg)  Comment: shoes on   Height: 6' 1.5\" (1.867 m)     Estimated body mass index is 30.97 kg/m² as calculated from the following:    Height as of this encounter: 6' 1.5\" (1.867 m). Weight as of this encounter: 238 lb (108 kg). Physical Exam  Vitals reviewed. Constitutional:       General: He is awake. Appearance: Normal appearance. He is well-developed and well-groomed. He is obese. He is not ill-appearing or toxic-appearing. HENT:      Head: Normocephalic and atraumatic. Right Ear: Hearing, tympanic membrane, ear canal and external ear normal.      Left Ear: Hearing, tympanic membrane, ear canal and external ear normal.      Nose: Nose normal.      Mouth/Throat:      Lips: Pink. Mouth: Mucous membranes are moist.      Pharynx: Oropharynx is clear. Eyes:      General: Lids are normal.      Extraocular Movements: Extraocular movements intact. Conjunctiva/sclera: Conjunctivae normal.      Pupils: Pupils are equal, round, and reactive to light. Neck:      Thyroid: No thyromegaly. Vascular: No carotid bruit. Cardiovascular:      Rate and Rhythm: Normal rate. Pulses: Normal pulses. Carotid pulses are 2+ on the right side and 2+ on the left side. Radial pulses are 2+ on the right side and 2+ on the left side. Posterior tibial pulses are 2+ on the right side and 2+ on the left side. Heart sounds: Normal heart sounds, S1 normal and S2 normal. Heart sounds not distant. No murmur heard. Pulmonary:      Effort: Pulmonary effort is normal.      Breath sounds: Normal breath sounds. Chest:   Breasts:      Right: No supraclavicular adenopathy. Left: No supraclavicular adenopathy. Abdominal:      General: Bowel sounds are normal. There is no abdominal bruit. Palpations: Abdomen is soft. Tenderness: There is no abdominal tenderness. Genitourinary:     Comments: Deferred  Musculoskeletal:         General: Normal range of motion. Cervical back: Full passive range of motion without pain, normal range of motion and neck supple. Right lower leg: No edema. Left lower leg: No edema. Lymphadenopathy:      Head:      Right side of head: No submental, submandibular, tonsillar, preauricular, posterior auricular or occipital adenopathy. Left side of head: No submental, submandibular, tonsillar, preauricular, posterior auricular or occipital adenopathy. Cervical: No cervical adenopathy. Right cervical: No superficial, deep or posterior cervical adenopathy. Left cervical: No superficial, deep or posterior cervical adenopathy. Upper Body:      Right upper body: No supraclavicular adenopathy. Left upper body: No supraclavicular adenopathy. Skin:     General: Skin is warm and dry. Capillary Refill: Capillary refill takes less than 2 seconds. Findings: Erythema and rash present. Rash is papular. Neurological:      General: No focal deficit present. Mental Status: He is alert and oriented to person, place, and time. Mental status is at baseline. Motor: Motor function is intact. No weakness. Coordination: Coordination is intact. Gait: Gait is intact. Psychiatric:         Attention and Perception: Attention and perception normal.         Mood and Affect: Mood and affect normal.         Speech: Speech normal.         Behavior: Behavior normal. Behavior is cooperative. Thought Content: Thought content normal.         Cognition and Memory: Cognition and memory normal.         Judgment: Judgment normal.                 ASSESSMENT/PLAN:  1.  Tinea corporis  -     miconazole (MICOTIN) 2 % cream; Apply topically 2 times daily x 1 week, Disp-1 each, R-0, Normal   Soap and water, pat dry   Follow up if persists   Low concern for infection   Well controlled diabetic       Care Gaps Addressed  PNA vaccine recommended  HIV screen not needed  Annual eye exam recommended for diabetic retinal exam, please ask eye doctor to fax us the report  [facility] Fax [phone]  Call insurance company to discuss coverage for shingles vaccine (Shingrix) 2 dose series   [place] 2021, 3 year fol up for polyps      I have reviewed patient's pertinent medical history, relevant laboratory and imaging studies, and past/future health maintenance. Discussed with the patient the importance of adhering to their current medication regimen as directed. Advised the patient that they should continue to work on eating a healthy balanced diet and staying active by exercising within their personal limits. Orders as listed above. Patient was advised to keep future appointments with their respective specialty care team(s). Patient had the opportunity to ask questions, all of which were answered to the best of my ability and with patient satisfaction. Patient understands and is agreeable with the care plan following today's visit. Patient is to schedule an appointment for any new or worsening symptoms. Go to ER for significant shortness of breath, chest pain, or uncontrolled pain or fever. I discussed with patient the risk and benefits of any medications that were prescribed today. I verified that the patient understands their medications, labs, and/or procedures. The patient is doing well with current medication regimen and does not have any barriers to adherence. The patient's self-management abilities are good. Return in about 2 months (around 5/22/2022) for Diabetes Follow Up. An electronic signature was used to authenticate this note.     --PRAVIN Foster CNP on 3/22/2022 at 3:23 PM

## 2022-03-22 NOTE — PATIENT INSTRUCTIONS
Patient Education        Ringworm: Care Instructions  Your Care Instructions  Ringworm is a fungus infection of the skin. It is not caused by a worm. Ringworm causes a round, scaly rash that may crack and itch. The rash can spread over a wide area. One type of fungus that causes ringworm is often found in locker rooms and swimming pools. It grows well in warm, moist areas of the skin, such as in skin folds. You can get ringworm by sharing towels, clothing, and sports equipment. You can also get it by touching someone who has ringworm. Ringworm is treated with cream that kills the fungus. If the rash is widespread, you may need pills to get rid of it. Ringworm often comes back after treatment. If the rash becomes infected with bacteria, you may need antibiotics. Follow-up care is a key part of your treatment and safety. Be sure to make and go to all appointments, and call your doctor if you are having problems. It's also a good idea to know your test results and keep a list of the medicines you take. How can you care for yourself at home? · Take your medicines exactly as prescribed. Call your doctor if you have any problems with your medicine. · Wash the rash with soap and water, remove flaky skin, and dry thoroughly. · Try an over-the-counter antifungal cream. Spread the cream beyond the edge or border of the rash. Follow the directions on the package. Do not stop using the medicine just because your skin clears up. You will probably need to continue treatment for 2 to 4 weeks or longer. · To avoid spreading it, wash your hands well after treating or touching the rash. · To keep from getting another infection:  ? Do not go barefoot in public places such as gyms or locker rooms. Avoid sharing towels and clothes. Use flip-flops or some other type of shoe in the shower. ? Do not wear tight clothes or let your skin stay damp for long periods, such as by staying in a wet bathing suit or sweaty clothes.   When should you call for help? Call your doctor now or seek immediate medical care if:    · You have signs of infection such as:  ? Pain, warmth, or swelling in your skin. ? Red streaks near a wound in the skin. ? Pus coming from the rash on your skin. ? A fever. Watch closely for changes in your health, and be sure to contact your doctor if:    · Your ringworm does not improve after 2 weeks of treatment.     · You do not get better as expected. Where can you learn more? Go to https://[employer]. org and sign in to your [employer] account. Enter T969 in the "[employer] box to learn more about \"Ringworm: Care Instructions. \"     If you do not have an account, please click on the \"Sign Up Now\" link. Current as of: March 3, 2021               Content Version: 13.1  © 2006-2021 [employer]wise[employer] Inc[employer]. Care instructions adapted under license by [facility] ([facility]). If you have questions about a medical condition or this instruction, always ask your healthcare professional. [address] any warranty or liability for your use of this information.

## 2022-05-25 DIAGNOSIS — N52.02 CORPORO-VENOUS OCCLUSIVE ERECTILE DYSFUNCTION: ICD-10-CM

## 2022-05-25 RX ORDER — TADALAFIL 10 MG/1
10 TABLET ORAL DAILY PRN
Qty: 30 TABLET | Refills: 1 | Status: SHIPPED | OUTPATIENT
Start: 2022-05-25

## 2022-05-25 NOTE — TELEPHONE ENCOUNTER
----- Message from Lupillo Rodríguez sent at 5/25/2022  1:31 PM EDT -----  Subject: Message to Provider    QUESTIONS  Information for Provider? Patient was seen on 11/18/2021-requesting script   that was given for Cialis but did not know dosage or instructions, and   never got it filled, please contact to advise. Jasmyn Garcia on [address]   ---------------------------------------------------------------------------  --------------  Narinder Slot INFO  What is the best way for the office to contact you? OK to leave message on   voicemail  Preferred Call Back Phone Number? [phone]  ---------------------------------------------------------------------------  --------------  SCRIPT ANSWERS  Relationship to Patient?  Self

## 2022-06-01 ENCOUNTER — TELEPHONE (OUTPATIENT)
Dept: FAMILY MEDICINE CLINIC | Age: 62
End: 2022-06-01

## 2022-06-01 RX ORDER — ROSUVASTATIN CALCIUM 5 MG/1
5 TABLET, COATED ORAL DAILY
Qty: 30 TABLET | Refills: 5 | Status: SHIPPED | OUTPATIENT
Start: 2022-06-01 | End: 2022-06-06

## 2022-06-01 NOTE — TELEPHONE ENCOUNTER
----- Message from Trev Holder sent at 6/1/2022  8:31 AM EDT -----  Subject: Refill Request    QUESTIONS  Name of Medication? rosuvastatin (CRESTOR) 5 MG tablet  Patient-reported dosage and instructions? once daily  How many days do you have left? 6  [id number]  Pharmacy phone number (if available)? [phone]  ---------------------------------------------------------------------------  --------------  Richard SALCEDO  What is the best way for the office to contact you? OK to leave message on   voicemail  Preferred Call Back Phone Number? [phone]  ---------------------------------------------------------------------------  --------------  SCRIPT ANSWERS  Relationship to Patient?  Self

## 2022-06-06 RX ORDER — ROSUVASTATIN CALCIUM 5 MG/1
5 TABLET, COATED ORAL DAILY
Qty: 30 TABLET | Refills: 0 | Status: SHIPPED | OUTPATIENT
Start: 2022-06-06

## 2022-06-29 ENCOUNTER — TELEPHONE (OUTPATIENT)
Dept: FAMILY MEDICINE CLINIC | Age: 62
End: 2022-06-29

## 2022-06-29 ENCOUNTER — OFFICE VISIT (OUTPATIENT)
Dept: FAMILY MEDICINE CLINIC | Age: 62
End: 2022-06-29
Payer: COMMERCIAL

## 2022-06-29 VITALS
SYSTOLIC BLOOD PRESSURE: 104 MMHG | WEIGHT: 235 LBS | HEART RATE: 77 BPM | HEIGHT: 72 IN | BODY MASS INDEX: 31.83 KG/M2 | DIASTOLIC BLOOD PRESSURE: 70 MMHG | OXYGEN SATURATION: 97 %

## 2022-06-29 DIAGNOSIS — E11.9 TYPE 2 DIABETES MELLITUS WITHOUT COMPLICATION, WITHOUT LONG-TERM CURRENT USE OF INSULIN (HCC): ICD-10-CM

## 2022-06-29 DIAGNOSIS — F41.1 ANXIETY STATE: ICD-10-CM

## 2022-06-29 DIAGNOSIS — N52.02 CORPORO-VENOUS OCCLUSIVE ERECTILE DYSFUNCTION: ICD-10-CM

## 2022-06-29 DIAGNOSIS — E78.00 PURE HYPERCHOLESTEROLEMIA: ICD-10-CM

## 2022-06-29 DIAGNOSIS — Z00.00 WELL ADULT EXAM: Primary | ICD-10-CM

## 2022-06-29 DIAGNOSIS — I10 PRIMARY HYPERTENSION: ICD-10-CM

## 2022-06-29 LAB
A/G RATIO: 2 (ref 1.1–2.2)
ALBUMIN SERPL-MCNC: 4.7 G/DL (ref 3.4–5)
ALP BLD-CCNC: 79 U/L (ref 40–129)
ALT SERPL-CCNC: 13 U/L (ref 10–40)
ANION GAP SERPL CALCULATED.3IONS-SCNC: 15 MMOL/L (ref 3–16)
AST SERPL-CCNC: 14 U/L (ref 15–37)
BILIRUB SERPL-MCNC: 1 MG/DL (ref 0–1)
BUN BLDV-MCNC: 11 MG/DL (ref 7–20)
CALCIUM SERPL-MCNC: 9.5 MG/DL (ref 8.3–10.6)
CHLORIDE BLD-SCNC: 102 MMOL/L (ref 99–110)
CHOLESTEROL, TOTAL: 137 MG/DL (ref 0–199)
CO2: 21 MMOL/L (ref 21–32)
CREAT SERPL-MCNC: 0.8 MG/DL (ref 0.8–1.3)
GFR AFRICAN AMERICAN: >60
GFR NON-AFRICAN AMERICAN: >60
GLUCOSE BLD-MCNC: 108 MG/DL (ref 70–99)
HBA1C MFR BLD: 5.3 %
HDLC SERPL-MCNC: 43 MG/DL (ref 40–60)
LDL CHOLESTEROL CALCULATED: 69 MG/DL
POTASSIUM SERPL-SCNC: 4.4 MMOL/L (ref 3.5–5.1)
SODIUM BLD-SCNC: 138 MMOL/L (ref 136–145)
TOTAL PROTEIN: 7 G/DL (ref 6.4–8.2)
TRIGL SERPL-MCNC: 123 MG/DL (ref 0–150)
VLDLC SERPL CALC-MCNC: 25 MG/DL

## 2022-06-29 PROCEDURE — 83036 HEMOGLOBIN GLYCOSYLATED A1C: CPT | Performed by: FAMILY MEDICINE

## 2022-06-29 PROCEDURE — 36415 COLL VENOUS BLD VENIPUNCTURE: CPT | Performed by: FAMILY MEDICINE

## 2022-06-29 PROCEDURE — 99396 PREV VISIT EST AGE 40-64: CPT | Performed by: FAMILY MEDICINE

## 2022-06-29 NOTE — PROGRESS NOTES
Erectile Dysfunction  Mariah Aguero MD        No Known Allergies    Past Medical History:   Diagnosis Date    Acute bronchitis     Anxiety     Hypercholesterolemia     Sprain of ribs     Type 2 diabetes mellitus without complication ([facility] [address]        No past surgical history on file. 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     Employer: [employer]     Comment: full time    Tobacco Use    Smoking status: Never Smoker    Smokeless tobacco: Never Used    Tobacco comment: Not to start smoking   Substance and Sexual Activity    Alcohol use: Yes     Comment: socially    Drug use: No    Sexual activity: Not on file   Other Topics Concern    Not on file   Social History Narrative    Not on file     Social Determinants of Health     Financial Resource Strain:     Difficulty of Paying Living Expenses: Not on file   Food Insecurity:     Worried About Running Out of Food in the Last Year: Not on file    Gregoria of Food in the Last Year: Not on file   Transportation Needs:     Lack of Transportation (Medical): Not on file    Lack of Transportation (Non-Medical):  Not on file   Physical Activity:     Days of Exercise per Week: Not on file    Minutes of Exercise per Session: Not on file   Stress:     Feeling of Stress : Not on file   Social Connections:     Frequency of Communication with Friends and Family: Not on file    Frequency of Social Gatherings with Friends and Family: Not on file    Attends Christian Services: Not on file    Active Member of Clubs or Organizations: Not on file    Attends Club or Organization Meetings: Not on file    Marital Status: Not on file   Intimate Partner Violence:     Fear of Current or Ex-Partner: Not on file    Emotionally Abused: Not on file    Physically Abused: Not on file    Sexually Abused: Not on file   Housing Stability:     Unable to Pay for Housing in the Last Year: Not on file    Number of Places Lived in the Last Year: Not on file    Unstable Housing in the Last Year: Not on file        Family History   Problem Relation Age of Onset    Diabetes Mother     High Cholesterol Mother     Diabetes Father     High Cholesterol Father     Cancer Father         prostate    Prostate Cancer Father     Heart Failure Maternal Grandmother     Heart Disease Maternal Grandmother     Cancer Maternal Grandfather        ADVANCE DIRECTIVE: N, <no information>    Vitals:    06/29/22 1348   BP: 104/70   Site: Left Upper Arm   Position: Sitting   Cuff Size: Medium Adult   Pulse: 77   SpO2: 97%   Weight: 235 lb (106.6 kg)   Height: 5' 11.5\" (1.816 m)     Estimated body mass index is 32.32 kg/m² as calculated from the following:    Height as of this encounter: 5' 11.5\" (1.816 m). Weight as of this encounter: 235 lb (106.6 kg). Physical Exam  Constitutional:       General: He is not in acute distress. Appearance: He is well-developed. Eyes:      General: No scleral icterus. Conjunctiva/sclera: Conjunctivae normal.   Cardiovascular:      Rate and Rhythm: Normal rate and regular rhythm. Pulses: Normal pulses. Heart sounds: Normal heart sounds. No murmur heard. No gallop. Pulmonary:      Effort: Pulmonary effort is normal. No respiratory distress. Breath sounds: Normal breath sounds. No wheezing, rhonchi or rales. Abdominal:      General: Bowel sounds are normal. There is no distension. Palpations: Abdomen is soft. Tenderness: There is no abdominal tenderness. Musculoskeletal:         General: No swelling. Skin:     Findings: No erythema or rash. Neurological:      Mental Status: He is alert. No flowsheet data found.     Lab Results   Component Value Date    CHOL 204 11/18/2021    CHOL 184 11/06/2020    CHOL 182 06/23/2020 TRIG 147 11/18/2021    TRIG 206 11/06/2020    TRIG 208 06/23/2020    HDL 54 11/18/2021    HDL 41 11/06/2020    HDL 43 06/23/2020    HDL 37 05/23/2012    HDL 44 05/07/2012    LDLCALC 121 11/18/2021    LDLCALC 102 11/06/2020    LDLCALC 97 06/23/2020    GLUCOSE 113 11/18/2021    LABA1C 4.8 11/18/2021    LABA1C 7.4 06/09/2021    LABA1C 6.9 11/06/2020       The 10-year ASCVD risk score (Danielle Garcia et al., 2013) is: 17.3%    Values used to calculate the score:      Age: 57 years      Sex: Male      Is Non- : No      Diabetic: Yes      Tobacco smoker: No      Systolic Blood Pressure: 883 mmHg      Is BP treated: Yes      HDL Cholesterol: 54 mg/dL      Total Cholesterol: 204 mg/dL    Immunization History   Administered Date(s) Administered    COVID-19, MODERNA BLUE border, Primary or Immunocompromised, (age 12y+), IM, 100 mcg/0.5mL 02/28/2021, 03/28/2021, 12/09/2021    Influenza, MDCK Quadv, IM, PF (Flucelvax 2 yrs and older) 11/18/2021    Influenza, Quadv, IM, PF (6 mo and older Fluzone, Flulaval, Fluarix, and 3 yrs and older Afluria) 09/29/2020    Tdap (Boostrix, Adacel) 03/26/2012       Health Maintenance   Topic Date Due    Pneumococcal 0-64 years Vaccine (1 - PCV) Never done    Diabetic retinal exam  Never done    Shingles vaccine (1 of 2) Never done    DTaP/Tdap/Td vaccine (2 - Td or Tdap) 03/26/2022    Diabetic foot exam  11/18/2022    A1C test (Diabetic or Prediabetic)  11/18/2022    Diabetic microalbuminuria test  11/18/2022    Lipids  11/18/2022    Prostate Specific Antigen (PSA) Screening or Monitoring  11/18/2022    Depression Screen  03/22/2023    Colorectal Cancer Screen  05/14/2024    Flu vaccine  Completed    COVID-19 Vaccine  Completed    Hepatitis C screen  Completed    Hepatitis A vaccine  Aged Out    Hib vaccine  Aged Out    Meningococcal (ACWY) vaccine  Aged Out    HIV screen  Discontinued       Assessment & Plan        Diagnosis Orders   1.  Well adult exam     2. Type 2 diabetes mellitus without complication, without long-term current use of insulin (HCC)  POCT glycosylated hemoglobin (Hb A1C)   3. Corporo-venous occlusive erectile dysfunction     4. Pure hypercholesterolemia  Lipid Panel   5. Primary hypertension  Comprehensive Metabolic Panel   6. Anxiety state       May want to consider reducing/ stopping zoloft at some point but hold for now - some fam hx of anxiety.   Consider bumping crestor pending lipid results to 10-20mg daily  bp stable on losartan  cialis prn  D/w p treduction in janumet dose from 2/d to 1/d as a1c 5.3%  Feet care  Routine eye exam dw/ pt  Colonoscopy 5/21 - recheck 5/24  psa okay 11/21  Reviewed recent labs 11/21  covid booster/ shingrix d/ wpt  Pneumovax in one year  Consider booster tdap/ annual flu shot  F/u 6 months/ sooner prn  --Bettina Rabago MD

## 2022-06-29 NOTE — TELEPHONE ENCOUNTER
----- Message from Jackelyn Porsche sent at 6/29/2022 10:52 AM EDT -----  Subject: Message to Provider    QUESTIONS  Information for Provider? Patient schedule for a visit at 1:40 today and   would like to have the orders for lab work entered early so that he can   get them done before his visit.   ---------------------------------------------------------------------------  --------------  [address]  What is the best way for the office to contact you? OK to leave message on   voicemail  Preferred Call Back Phone Number? [phone]  ---------------------------------------------------------------------------  --------------  SCRIPT ANSWERS  Relationship to Patient?  Self

## 2022-07-15 ENCOUNTER — TELEPHONE (OUTPATIENT)
Dept: FAMILY MEDICINE CLINIC | Age: 62
End: 2022-07-15

## 2022-07-15 RX ORDER — VALACYCLOVIR HYDROCHLORIDE 1 G/1
1000 TABLET, FILM COATED ORAL 3 TIMES DAILY
Qty: 21 TABLET | Refills: 0 | Status: SHIPPED | OUTPATIENT
Start: 2022-07-15 | End: 2022-07-22

## 2022-07-15 RX ORDER — METHYLPREDNISOLONE 4 MG/1
TABLET ORAL
Qty: 1 KIT | Refills: 0 | Status: SHIPPED | OUTPATIENT
Start: 2022-07-15 | End: 2022-07-21

## 2022-07-15 NOTE — TELEPHONE ENCOUNTER
POSS SHINGLES, RASH ON HIP, BURNING RIGHT SIDE OF BODY ONLY. STARTED YESTERDAY. NO NERVE PAIN. CAN WE SEND MEDICATIONS TO [address] DANIEL AND TATY?KW WORRIED ABOUT GOING INTO THE WEEKEND. Aayush Mcmullen

## 2022-07-18 ENCOUNTER — TELEPHONE (OUTPATIENT)
Dept: FAMILY MEDICINE CLINIC | Age: 62
End: 2022-07-18

## 2022-07-18 ENCOUNTER — TELEMEDICINE (OUTPATIENT)
Dept: FAMILY MEDICINE CLINIC | Age: 62
End: 2022-07-18
Payer: COMMERCIAL

## 2022-07-18 DIAGNOSIS — B02.8 HERPES ZOSTER WITH COMPLICATION: Primary | ICD-10-CM

## 2022-07-18 DIAGNOSIS — R73.9 HYPERGLYCEMIA: ICD-10-CM

## 2022-07-18 PROCEDURE — 99213 OFFICE O/P EST LOW 20 MIN: CPT | Performed by: NURSE PRACTITIONER

## 2022-07-18 RX ORDER — GABAPENTIN 100 MG/1
100 CAPSULE ORAL 2 TIMES DAILY
Qty: 60 CAPSULE | Refills: 1 | Status: SHIPPED | OUTPATIENT
Start: 2022-07-18 | End: 2022-08-17

## 2022-07-18 SDOH — ECONOMIC STABILITY: TRANSPORTATION INSECURITY
IN THE PAST 12 MONTHS, HAS LACK OF TRANSPORTATION KEPT YOU FROM MEETINGS, WORK, OR FROM GETTING THINGS NEEDED FOR DAILY LIVING?: NO

## 2022-07-18 SDOH — ECONOMIC STABILITY: TRANSPORTATION INSECURITY
IN THE PAST 12 MONTHS, HAS THE LACK OF TRANSPORTATION KEPT YOU FROM MEDICAL APPOINTMENTS OR FROM GETTING MEDICATIONS?: NO

## 2022-07-18 SDOH — ECONOMIC STABILITY: FOOD INSECURITY: WITHIN THE PAST 12 MONTHS, YOU WORRIED THAT YOUR FOOD WOULD RUN OUT BEFORE YOU GOT MONEY TO BUY MORE.: NEVER TRUE

## 2022-07-18 SDOH — ECONOMIC STABILITY: FOOD INSECURITY: WITHIN THE PAST 12 MONTHS, THE FOOD YOU BOUGHT JUST DIDN'T LAST AND YOU DIDN'T HAVE MONEY TO GET MORE.: NEVER TRUE

## 2022-07-18 ASSESSMENT — ENCOUNTER SYMPTOMS
SINUS PAIN: 0
CHEST TIGHTNESS: 0
SINUS PRESSURE: 0
DIARRHEA: 0
NAUSEA: 0
EYE DISCHARGE: 0
COLOR CHANGE: 0
COUGH: 0
ABDOMINAL DISTENTION: 0
SHORTNESS OF BREATH: 0
ABDOMINAL PAIN: 0
BACK PAIN: 0
CONSTIPATION: 0

## 2022-07-18 ASSESSMENT — SOCIAL DETERMINANTS OF HEALTH (SDOH): HOW HARD IS IT FOR YOU TO PAY FOR THE VERY BASICS LIKE FOOD, HOUSING, MEDICAL CARE, AND HEATING?: NOT HARD AT ALL

## 2022-07-18 NOTE — TELEPHONE ENCOUNTER
Patients wife called and stated the blood sugar is not coming down and wants to know if he can stop taking the steroids that were prescribed for shingles.   Please call patient

## 2022-07-19 NOTE — TELEPHONE ENCOUNTER
Is taking steroids for shingles along with an antiviral.  Sugar went up to 339. Patient was located on his waist.  Can they stop the steroids? Okay to stop the prednisone. They have pioglitazone 30 mg 6 pills that he was on before. They were told they could take a half a pill by a nurse in order to get his blood sugars down. Okay to take 1/2 pill until blood sugars come down to normal.  Also recommended vitamin D 3000 IU today and 1000 IU twice daily to help his immune system. Explained it helps prevent severe COVID as well as helping the shingles.

## 2022-08-08 DIAGNOSIS — F41.1 ANXIETY STATE: ICD-10-CM

## 2022-08-15 RX ORDER — LOSARTAN POTASSIUM 100 MG/1
TABLET ORAL
Qty: 90 TABLET | Refills: 1 | Status: SHIPPED | OUTPATIENT
Start: 2022-08-15

## 2022-08-16 ENCOUNTER — TELEPHONE (OUTPATIENT)
Dept: FAMILY MEDICINE CLINIC | Age: 62
End: 2022-08-16

## 2022-08-16 NOTE — TELEPHONE ENCOUNTER
Patient called and stated he went to the restroom yesterday and there was blood in his urine. He states it was for only one time and then no more.       He would like to know if that is sometimes normal or if he needs to do something

## 2022-08-16 NOTE — TELEPHONE ENCOUNTER
Would be good to make appointment and check urine or at the least check a urinalysis but if microscopic blood remains =needs evaluation / urine culture

## 2022-08-17 ENCOUNTER — NURSE ONLY (OUTPATIENT)
Dept: FAMILY MEDICINE CLINIC | Age: 62
End: 2022-08-17
Payer: COMMERCIAL

## 2022-08-17 DIAGNOSIS — R31.9 HEMATURIA, UNSPECIFIED TYPE: Primary | ICD-10-CM

## 2022-08-17 LAB
BILIRUBIN, POC: NEGATIVE
BLOOD URINE, POC: NEGATIVE
CLARITY, POC: CLEAR
COLOR, POC: YELLOW
GLUCOSE URINE, POC: 250
KETONES, POC: NEGATIVE
LEUKOCYTE EST, POC: NEGATIVE
NITRITE, POC: NEGATIVE
PH, POC: 6
PROTEIN, POC: NEGATIVE
SPECIFIC GRAVITY, POC: 1.02
UROBILINOGEN, POC: 0.2

## 2022-08-17 PROCEDURE — 81002 URINALYSIS NONAUTO W/O SCOPE: CPT | Performed by: FAMILY MEDICINE

## 2022-10-07 DIAGNOSIS — E11.9 TYPE 2 DIABETES MELLITUS WITHOUT COMPLICATION, WITHOUT LONG-TERM CURRENT USE OF INSULIN (HCC): ICD-10-CM

## 2022-10-07 RX ORDER — SITAGLIPTIN AND METFORMIN HYDROCHLORIDE 500; 50 MG/1; MG/1
TABLET, FILM COATED ORAL
Qty: 180 TABLET | Refills: 1 | Status: SHIPPED | OUTPATIENT
Start: 2022-10-07

## 2022-10-12 ENCOUNTER — TELEPHONE (OUTPATIENT)
Dept: FAMILY MEDICINE CLINIC | Age: 62
End: 2022-10-12

## 2022-10-12 DIAGNOSIS — F41.1 ANXIETY STATE: ICD-10-CM

## 2022-10-12 NOTE — TELEPHONE ENCOUNTER
Patient would like a script for sertraline sent to a local pharmacy as he will run out before [facility] gets it mailed out      1 month supply    [address], [address] [phone] Osvaldo Mirza [phone]      3 month supply     [address], [address] [facility] [phone] - F [phone]

## 2022-11-11 ENCOUNTER — TELEPHONE (OUTPATIENT)
Dept: FAMILY MEDICINE CLINIC | Age: 62
End: 2022-11-11

## 2022-11-11 NOTE — TELEPHONE ENCOUNTER
LAST VISIT 6/9/22 [facility], NEXT VISIT not scheduled. JANUMET  MG per tablet [[id number]]     Order Details  Dose, Route, Frequency: As Directed   Dispense Quantity: 180 tablet Refills: 1          Sig: TAKE 1 TABLET TWICE A DAY WITH MEALS         Start Date: 10/07/22 End Date: --   Written Date: 10/07/22 Expiration Date: 10/07/23       Diagnosis Association: Type 2 diabetes mellitus without complication, without long-term current use of insulin (HCC) (E11.9)   Original Order:  JANUMET  MG per tablet [[id number]]   Providers    Authorizing Provider: PRAVIN Ching CNP NPI: [id number]   Ordering User:   João Velasquez LPN          Pharmacy    [facility] [address] [phone] - F [phone]   [place] [address]   Phone:  [phone]  Fax:  [phone]       AM.

## 2022-11-11 NOTE — TELEPHONE ENCOUNTER
Double up on janumet - taking 2 twice daily and significantly reduce carbohydrates and sugars in your diet. Schedule appointment w/ me in a month to check a1c.

## 2022-11-11 NOTE — TELEPHONE ENCOUNTER
Patient called and said his BS yesterday was 270's today it was in the 280's      He said hes not feeling himself and can tell its off    He does take his janumet  mg       He would like to know what he needs to do

## 2022-12-02 ENCOUNTER — TELEPHONE (OUTPATIENT)
Dept: FAMILY MEDICINE CLINIC | Age: 62
End: 2022-12-02

## 2022-12-02 RX ORDER — ROSUVASTATIN CALCIUM 5 MG/1
5 TABLET, COATED ORAL DAILY
Qty: 90 TABLET | Refills: 2 | Status: SHIPPED | OUTPATIENT
Start: 2022-12-02

## 2022-12-02 NOTE — TELEPHONE ENCOUNTER
Patient is requesting a refill on his rosuvastatin 5mg     [address], [address] [place] [address] -  [phone]

## 2022-12-15 ENCOUNTER — OFFICE VISIT (OUTPATIENT)
Dept: FAMILY MEDICINE CLINIC | Age: 62
End: 2022-12-15
Payer: COMMERCIAL

## 2022-12-15 ENCOUNTER — TELEPHONE (OUTPATIENT)
Dept: FAMILY MEDICINE CLINIC | Age: 62
End: 2022-12-15

## 2022-12-15 DIAGNOSIS — E78.00 PURE HYPERCHOLESTEROLEMIA: ICD-10-CM

## 2022-12-15 DIAGNOSIS — E11.9 TYPE 2 DIABETES MELLITUS WITHOUT COMPLICATION, WITHOUT LONG-TERM CURRENT USE OF INSULIN (HCC): Primary | ICD-10-CM

## 2022-12-15 DIAGNOSIS — Z78.9 STATIN INTOLERANCE: ICD-10-CM

## 2022-12-15 DIAGNOSIS — F41.1 ANXIETY STATE: ICD-10-CM

## 2022-12-15 DIAGNOSIS — I10 PRIMARY HYPERTENSION: ICD-10-CM

## 2022-12-15 DIAGNOSIS — J10.1 INFLUENZA A: ICD-10-CM

## 2022-12-15 LAB
HBA1C MFR BLD: 5.2 %
INFLUENZA A ANTIGEN, POC: POSITIVE
INFLUENZA B ANTIGEN, POC: NEGATIVE
LOT EXPIRE DATE: ABNORMAL
LOT KIT NUMBER: ABNORMAL
SARS-COV-2, POC: ABNORMAL
VALID INTERNAL CONTROL: 0
VENDOR AND KIT NAME POC: ABNORMAL

## 2022-12-15 PROCEDURE — 3044F HG A1C LEVEL LT 7.0%: CPT | Performed by: FAMILY MEDICINE

## 2022-12-15 PROCEDURE — 83036 HEMOGLOBIN GLYCOSYLATED A1C: CPT | Performed by: FAMILY MEDICINE

## 2022-12-15 PROCEDURE — 99214 OFFICE O/P EST MOD 30 MIN: CPT | Performed by: FAMILY MEDICINE

## 2022-12-15 PROCEDURE — 87428 SARSCOV & INF VIR A&B AG IA: CPT | Performed by: FAMILY MEDICINE

## 2022-12-15 RX ORDER — OSELTAMIVIR PHOSPHATE 75 MG/1
75 CAPSULE ORAL 2 TIMES DAILY
Qty: 10 CAPSULE | Refills: 0 | Status: SHIPPED | OUTPATIENT
Start: 2022-12-15 | End: 2022-12-20

## 2022-12-15 RX ORDER — OSELTAMIVIR PHOSPHATE 75 MG/1
75 CAPSULE ORAL 2 TIMES DAILY
Qty: 10 CAPSULE | Refills: 0 | Status: SHIPPED | OUTPATIENT
Start: 2022-12-15 | End: 2022-12-15 | Stop reason: SDUPTHER

## 2022-12-15 RX ORDER — BENZONATATE 200 MG/1
200 CAPSULE ORAL 3 TIMES DAILY PRN
Qty: 30 CAPSULE | Refills: 0 | Status: SHIPPED | OUTPATIENT
Start: 2022-12-15 | End: 2022-12-22

## 2022-12-15 NOTE — PROGRESS NOTES
[address] Note    Date: 12/15/2022                                               Subjective:     Chief Complaint   Patient presents with    Diabetes     3 MO DM ROUTINE FOLLOW UP AIC      HPI    Recent/ current bout of influenza A  Fever gone but chilling last night  Bed ridden last 2 days - a lot of cough/ phlegm - slight chill  Chest hurts from coughing - no energy - no appetite  Wife sick also  Worried about pneumonia  Mild wheezing -not sob  Taking janumet 2 bid - double high end of dose  Checks sugar at home periodically  Vision stable - utd on eye exam  No neuropathy. Mood better w/ USP - work stress gone  Not real active - watching diet some -not real strict         Patient Active Problem List    Diagnosis Date Noted    Statin intolerance 03/02/2018    Type 2 diabetes mellitus without complication ([address] 38/45/4039    Hypertension 04/09/2013    ACE-inhibitor cough 08/24/2012    Pure hypercholesterolemia 05/23/2011    Anxiety state 05/23/2011     Past Medical History:   Diagnosis Date    Acute bronchitis     Anxiety     Hypercholesterolemia     Sprain of ribs     Type 2 diabetes mellitus without complication ([address]      No past surgical history on file.   Nurse Only on 08/17/2022   Component Date Value Ref Range Status    Color, UA 08/17/2022 yellow   Final    Clarity, UA 08/17/2022 clear   Final    Glucose, UA POC 08/17/2022 250   Final    Bilirubin, UA 08/17/2022 Negative   Final    Ketones, UA 08/17/2022 Negative   Final    Spec Grav, UA 08/17/2022 1.025   Final    Blood, UA POC 08/17/2022 Negative   Final    pH, UA 08/17/2022 6.0   Final    Protein, UA POC 08/17/2022 Negative   Final    Urobilinogen, UA 08/17/2022 0.2   Final    Leukocytes, UA 08/17/2022 Negative   Final    Nitrite, UA 08/17/2022 Negative   Final     Family History   Problem Relation Age of Onset    Diabetes Mother     High Cholesterol Mother     Diabetes Father     High Cholesterol Father     Cancer Father prostate    Prostate Cancer Father     Heart Failure Maternal Grandmother     Heart Disease Maternal Grandmother     Cancer Maternal Grandfather      Current Outpatient Medications   Medication Sig Dispense Refill    rosuvastatin (CRESTOR) 5 MG tablet Take 1 tablet by mouth daily 90 tablet 2    sertraline (ZOLOFT) 50 MG tablet TAKE 1 TABLET DAILY 30 tablet 0    sertraline (ZOLOFT) 50 MG tablet Take 1 tablet by mouth daily 90 tablet 0    JANUMET  MG per tablet TAKE 1 TABLET TWICE A DAY WITH MEALS 180 tablet 1    losartan (COZAAR) 100 MG tablet TAKE 1 TABLET DAILY 90 tablet 1    gabapentin (NEURONTIN) 100 MG capsule Take 1 capsule by mouth in the morning and 1 capsule before bedtime. Do all this for 30 days. Take until nerve pain from shingles improves and then stop. 60 capsule 1    tadalafil (CIALIS) 10 MG tablet Take 1 tablet by mouth daily as needed for Erectile Dysfunction 30 tablet 1     No current facility-administered medications for this visit. No Known Allergies    Review of Systems    Objective: There were no vitals taken for this visit. BP Readings from Last 3 Encounters:   06/29/22 104/70   03/22/22 124/78   11/18/21 124/82       Pulse Readings from Last 3 Encounters:   06/29/22 77   03/22/22 83   11/18/21 72       Wt Readings from Last 3 Encounters:   06/29/22 235 lb (106.6 kg)   03/22/22 238 lb (108 kg)   11/18/21 242 lb (109.8 kg)       Physical Exam  Vitals and nursing note reviewed. Constitutional:       Appearance: He is well-developed. HENT:      Head: Normocephalic and atraumatic. Eyes:      General: No scleral icterus. Conjunctiva/sclera: Conjunctivae normal.   Pulmonary:      Effort: Pulmonary effort is normal. No respiratory distress. Breath sounds: Rhonchi present. No rales. Skin:     General: Skin is warm and dry. Neurological:      Mental Status: He is alert and oriented to person, place, and time.        Assessment/Plan:     A1c today 5.2%  Hemoglobin A1C Date Value Ref Range Status   06/29/2022 5.3 % Final     Bp stable on losartan 100 daily  Mood stable on present dose zoloft - consider weaning off this spring if stable  Bs control very good on janumet - d/w pt option of holding completely will reduce back to 1 in am only - lab visit 3 months - f/u 6 months  Appt w/ fasting labs 6 months    Diet/ activity dw/ pt  Cont crestor for cv reduction  Cialis prn-no needing  Gabapentin for neuropathy -on hold - not needed presently  Routine eye exam/ feet care d/ wpt  Sx'atic tx for influenza d/ wpt  Labs 6/22 reviewed  Harsha Wolfe MD, MD  12/15/2022  1:36 PM

## 2022-12-15 NOTE — TELEPHONE ENCOUNTER
HE WAS SEEN TODAY  - HE HAS THE FLU AND  YOU CALLED IN [facility] TO [facility] AND THEY ARE OUT OF IT  - BUT -Bess Wise HAS IT - PLEASE CALL INTO      [facility] [phone] Kareen Wallace, [address] - [place] [phone] - F [phone]    PT @  [phone]

## 2023-01-06 ENCOUNTER — TELEPHONE (OUTPATIENT)
Dept: FAMILY MEDICINE CLINIC | Age: 63
End: 2023-01-06

## 2023-01-06 DIAGNOSIS — J40 BRONCHITIS: Primary | ICD-10-CM

## 2023-01-06 RX ORDER — AZITHROMYCIN 250 MG/1
250 TABLET, FILM COATED ORAL SEE ADMIN INSTRUCTIONS
Qty: 6 TABLET | Refills: 0 | Status: SHIPPED | OUTPATIENT
Start: 2023-01-06 | End: 2023-01-11

## 2023-01-06 NOTE — TELEPHONE ENCOUNTER
Patient called and said he feels better after taking the tamiflu but the cough is bad with yellow drainage. He said he is taking mucinex sudafed and its not working \    He was prescribed something for his cough but its not working either .       He said he may need an antibiotic     [address], [address] [place] [address] -  [phone]

## 2023-01-06 NOTE — TELEPHONE ENCOUNTER
Zpak sent to pharmacy. Please eat yogurt daily or take probiotic supplement while on this antibiotic and for additional week after finishing the antibiotic to protect your GI tract.

## 2023-02-09 RX ORDER — LOSARTAN POTASSIUM 100 MG/1
TABLET ORAL
Qty: 90 TABLET | Refills: 3 | Status: SHIPPED | OUTPATIENT
Start: 2023-02-09

## 2023-02-09 NOTE — TELEPHONE ENCOUNTER
HUMBERTO 12/15/22 WITH DR PICKETT FOR DM NV NONE  Return in about 6 months (around 6/15/2023) for nurse visit a1c 3 months

## 2023-02-09 NOTE — TELEPHONE ENCOUNTER
Future Appointments    This patient does not currently have any appointments scheduled.   Past Visits    Date Provider Specialty Visit Type Primary Dx   12/15/2022 Joseph Delacruz MD Family Medicine Office Visit Type 2 diabetes mellitus without complication, without long-term current use of insulin ([facility]

## 2023-04-05 DIAGNOSIS — E11.9 TYPE 2 DIABETES MELLITUS WITHOUT COMPLICATION, WITHOUT LONG-TERM CURRENT USE OF INSULIN (HCC): ICD-10-CM

## 2023-04-05 RX ORDER — SITAGLIPTIN AND METFORMIN HYDROCHLORIDE 500; 50 MG/1; MG/1
TABLET, FILM COATED ORAL
Qty: 180 TABLET | Refills: 0 | Status: SHIPPED | OUTPATIENT
Start: 2023-04-05

## 2023-06-02 ENCOUNTER — TELEPHONE (OUTPATIENT)
Dept: FAMILY MEDICINE CLINIC | Age: 63
End: 2023-06-02

## 2023-06-02 NOTE — TELEPHONE ENCOUNTER
CALLED PT HE HAS 16 STITCHES OUTSIDE 2 DISOLVING INSIDE, ADVISED KEEPING CLEAN AND DRY NO PEROXIDE, AND TO USE ANTIBIOTIC OINTMENT. ADVISED WHAT TO LOOK FOR AS FAR AS SIGNS OF INFECTION. Verdell Goldberg

## 2023-06-02 NOTE — TELEPHONE ENCOUNTER
PT WIFE CALLING - SHE IS DOING THE WOUND CARE FOR A LEG INJURY FOR ZULMA - SHE IS NERVOUS AND WONDERING IF SHE IS TAKING PROPER CARE OF IT     COULD SOMEONE PLEASE CALL HER    NENA @  [phone]

## 2023-06-05 ENCOUNTER — OFFICE VISIT (OUTPATIENT)
Dept: FAMILY MEDICINE CLINIC | Age: 63
End: 2023-06-05
Payer: MEDICARE

## 2023-06-05 VITALS
RESPIRATION RATE: 18 BRPM | OXYGEN SATURATION: 98 % | WEIGHT: 232 LBS | HEART RATE: 80 BPM | HEIGHT: 72 IN | SYSTOLIC BLOOD PRESSURE: 128 MMHG | BODY MASS INDEX: 31.42 KG/M2 | DIASTOLIC BLOOD PRESSURE: 76 MMHG

## 2023-06-05 DIAGNOSIS — S81.811D LACERATION OF RIGHT LOWER LEG, SUBSEQUENT ENCOUNTER: Primary | ICD-10-CM

## 2023-06-05 PROCEDURE — G8417 CALC BMI ABV UP PARAM F/U: HCPCS

## 2023-06-05 PROCEDURE — 3017F COLORECTAL CA SCREEN DOC REV: CPT

## 2023-06-05 PROCEDURE — 1036F TOBACCO NON-USER: CPT

## 2023-06-05 PROCEDURE — 99213 OFFICE O/P EST LOW 20 MIN: CPT

## 2023-06-05 PROCEDURE — 3078F DIAST BP <80 MM HG: CPT

## 2023-06-05 PROCEDURE — 3074F SYST BP LT 130 MM HG: CPT

## 2023-06-05 PROCEDURE — G8427 DOCREV CUR MEDS BY ELIG CLIN: HCPCS

## 2023-06-05 RX ORDER — SULFAMETHOXAZOLE AND TRIMETHOPRIM 800; 160 MG/1; MG/1
1 TABLET ORAL 2 TIMES DAILY
Qty: 14 TABLET | Refills: 0 | Status: SHIPPED | OUTPATIENT
Start: 2023-06-05 | End: 2023-06-12

## 2023-06-05 SDOH — ECONOMIC STABILITY: HOUSING INSECURITY
IN THE LAST 12 MONTHS, WAS THERE A TIME WHEN YOU DID NOT HAVE A STEADY PLACE TO SLEEP OR SLEPT IN A SHELTER (INCLUDING NOW)?: NO

## 2023-06-05 SDOH — ECONOMIC STABILITY: INCOME INSECURITY: HOW HARD IS IT FOR YOU TO PAY FOR THE VERY BASICS LIKE FOOD, HOUSING, MEDICAL CARE, AND HEATING?: NOT HARD AT ALL

## 2023-06-05 SDOH — ECONOMIC STABILITY: FOOD INSECURITY: WITHIN THE PAST 12 MONTHS, YOU WORRIED THAT YOUR FOOD WOULD RUN OUT BEFORE YOU GOT MONEY TO BUY MORE.: NEVER TRUE

## 2023-06-05 SDOH — ECONOMIC STABILITY: FOOD INSECURITY: WITHIN THE PAST 12 MONTHS, THE FOOD YOU BOUGHT JUST DIDN'T LAST AND YOU DIDN'T HAVE MONEY TO GET MORE.: NEVER TRUE

## 2023-06-05 ASSESSMENT — PATIENT HEALTH QUESTIONNAIRE - PHQ9
SUM OF ALL RESPONSES TO PHQ QUESTIONS 1-9: 0
1. LITTLE INTEREST OR PLEASURE IN DOING THINGS: 0
2. FEELING DOWN, DEPRESSED OR HOPELESS: 0
SUM OF ALL RESPONSES TO PHQ9 QUESTIONS 1 & 2: 0

## 2023-06-05 ASSESSMENT — ENCOUNTER SYMPTOMS
NAUSEA: 0
WHEEZING: 0
PHOTOPHOBIA: 0
VOMITING: 0
DIARRHEA: 0
CHEST TIGHTNESS: 0
SHORTNESS OF BREATH: 0
COLOR CHANGE: 1
BACK PAIN: 0
COUGH: 0
EYE ITCHING: 0
EYE PAIN: 0

## 2023-06-05 NOTE — PATIENT INSTRUCTIONS
at time of . Instead of the fee, you can choose to have the paperwork filled out during a separate office visit that is for filling out the paperwork only. Medication Samples: This office does not carry medication samples. If you need assistance in getting your medications, then please let the medical assistant know so they can help you sign up for a drug assistance program that can help get medications at a reduced cost or even free (if you qualify). Workman's Comp Claims: We do not handle workman's comp cases or claims. You will need to go to an urgent care to be seen or to whomever your employer uses. General - Any abusive/rude behavior toward staff/providers may be cause for dismissal.    Mellissa Beltran may receive a survey regarding the care you received during your visit. Your input is valuable to us. We encourage you to complete and return your survey. We hope you will choose us in the future for your healthcare needs.

## 2023-06-05 NOTE — PROGRESS NOTES
Johnson Meeks (:  1960) is a 57 y.o. male,Established patient, here for evaluation of the following chief complaint(s): Wound Check (Sutures, red and warm to the touch)         ASSESSMENT/PLAN:  1. Laceration of right lower leg, subsequent encounter  -Laceration with infection secondary to absence of antibacterial wound care. -Treatment options discussed. Bactrim is being sent to the pharmacy. The medication uses and side effects were discussed with the patient. Patient verbalized understanding and agrees to the plan.  -Educated on proper wound care. -Continue Keflex. Continue probiotics.  -Follow-up in 3 to 4 days for suture removal.  -     sulfamethoxazole-trimethoprim (BACTRIM DS;SEPTRA DS) 800-160 MG per tablet; Take 1 tablet by mouth 2 times daily for 7 days, Disp-14 tablet, R-0Normal      Return in about 3 days (around 2023), or if symptoms worsen or fail to improve, for Suture removal.         Subjective   SUBJECTIVE/OBJECTIVE:  SHANAE  Milla Benjamin presents today to follow-up on his ER visit. He went to the ER on  at [facility] for right lower extremity laceration from a metal ramp that goes onto the back of a truck. When the laceration occurred, he saw bone. He uses like to close the ramp and the sharp edges cut him. His tetanus shot was updated in the ER. He was given Keflex for 10 days at discharge. Laceration was closed with 14 sutures and 2 internal dissolvable sutures. It was recommended to have sutures removed after 7 to 10 days. Sometimes he has a burning sensation. The area is red. He cannot put pressure on the foot and walk on it. Friday the wound looked good,  it started to look bad and today is worse. At home, no antibacterial methods were initiated and cleaning, including not limited to alcohol, hydrogen peroxide, chlorhexidine, Hibiclens, Betadine, Neosporin, Polysporin, or bacitracin. The wound was treated with Aquaphor, sterile saline, and gauze.       Review of

## 2023-06-09 ENCOUNTER — TELEPHONE (OUTPATIENT)
Dept: FAMILY MEDICINE CLINIC | Age: 63
End: 2023-06-09

## 2023-06-09 ENCOUNTER — OFFICE VISIT (OUTPATIENT)
Dept: FAMILY MEDICINE CLINIC | Age: 63
End: 2023-06-09

## 2023-06-09 VITALS
SYSTOLIC BLOOD PRESSURE: 122 MMHG | WEIGHT: 227.4 LBS | RESPIRATION RATE: 16 BRPM | OXYGEN SATURATION: 97 % | HEART RATE: 88 BPM | BODY MASS INDEX: 30.8 KG/M2 | HEIGHT: 72 IN | DIASTOLIC BLOOD PRESSURE: 76 MMHG

## 2023-06-09 DIAGNOSIS — S81.811D LACERATION OF RIGHT LOWER LEG, SUBSEQUENT ENCOUNTER: ICD-10-CM

## 2023-06-09 DIAGNOSIS — E11.9 TYPE 2 DIABETES MELLITUS WITHOUT COMPLICATION, WITHOUT LONG-TERM CURRENT USE OF INSULIN (HCC): Primary | ICD-10-CM

## 2023-06-09 LAB — HBA1C MFR BLD: 6.2 %

## 2023-06-09 RX ORDER — SITAGLIPTIN AND METFORMIN HYDROCHLORIDE 500; 50 MG/1; MG/1
1 TABLET, FILM COATED ORAL 2 TIMES DAILY WITH MEALS
Qty: 180 TABLET | Refills: 0 | Status: SHIPPED | OUTPATIENT
Start: 2023-06-09

## 2023-06-09 RX ORDER — DOXYCYCLINE HYCLATE 100 MG
100 TABLET ORAL 2 TIMES DAILY
Qty: 14 TABLET | Refills: 0 | Status: SHIPPED | OUTPATIENT
Start: 2023-06-09 | End: 2023-06-16

## 2023-06-09 NOTE — TELEPHONE ENCOUNTER
----- Message from Tory Davis sent at 6/9/2023  8:07 AM EDT -----  Subject: Message to Provider    QUESTIONS  Information for Provider? Patient is requesting to come in earlier with   Dr. Ann Freire if he gets any cancellation. He states that his injury looks   like it is not healing properly with the stitches.   ---------------------------------------------------------------------------  --------------  Ania Guerrero INFO  [phone]; OK to leave message on voicemail  ---------------------------------------------------------------------------  --------------  SCRIPT ANSWERS  Relationship to Patient?  Self

## 2023-06-09 NOTE — PATIENT INSTRUCTIONS
checkups, etc. will not be addressed during this visit. Medication Refills: Refills are handled electronically so please contact your pharmacy for medication refills even if current refills have been exhausted. If you are on a controlled medication you will be referred to a specialist (pain specialist, psychiatry, etc). Forms: There is a $35 fee to fill out FMLA/Disability paperwork, payable at time of . Instead of the fee, you can choose to have the paperwork filled out during a separate office visit that is for filling out the paperwork only. Medication Samples: This office does not carry medication samples. If you need assistance in getting your medications, then please let the medical assistant know so they can help you sign up for a drug assistance program that can help get medications at a reduced cost or even free (if you qualify). Workman's Comp Claims: We do not handle workman's comp cases or claims. You will need to go to an urgent care to be seen or to whomever your employer uses. General - Any abusive/rude behavior toward staff/providers may be cause for dismissal.    Rosalba Cisneros may receive a survey regarding the care you received during your visit. Your input is valuable to us. We encourage you to complete and return your survey. We hope you will choose us in the future for your healthcare needs.

## 2023-06-09 NOTE — PROGRESS NOTES
Kishor Bautista (:  1960) is a 57 y.o. male,Established patient, here for evaluation of the following chief complaint(s):  Diabetes (6 mon DM f/u)         ASSESSMENT/PLAN:  1. Type 2 diabetes mellitus without complication, without long-term current use of insulin (HCC)  -A1C 6.2% today, increased from 5.2% 6 months ago. -Discussed healthy lifestyle changes to make to prevent A1C rising back into diabetic range: limit carbs in diet, Increase water to 64 ounces a day, sleep at least 6 hours but no more than 10 hours a night, increase exercise/MVPA to 150 minutes/week, limit fatty processed food in diet, increase healthy fats/HDLs such as cod, salmon, tuna, walnuts or fish oil supplements to boost HDL levels (good cholesterol), ensure enough fiber via fruits and vegetables, and consider limiting 2,000-calorie diet daily.  -Recommended to make diabetic eye appointment in the coming months. -Due to 1% increase of A1C, increase frequency of diabetic follow-up to 3 months until A1C improves again.   -     POCT glycosylated hemoglobin (Hb A1C)  -     JANUMET  MG per tablet; Take 1 tablet by mouth 2 times daily (with meals), Disp-180 tablet, R-0, DAWNormal  2. Laceration of right lower leg, subsequent encounter  -Laceration well approximated, appears healing in wound quality and in infection. -13 sutures were removed in office. After careful examination of the laceration with a lighted and magnified otoscope, no additional sutures were identified to be in place.   -Patient tolerated procedure well with minimal blood loss. Wound remained mostly well approximated, but steri strips were placed to ensure no dehiscence occurs. -Wife expresses concern over wound and traveling. We discussed continued proper wound care with travel. Wife would like an additional antibiotic in case the wound appears infected again over the 15 day trip. -Treatment options discussed. Doxycycline is being sent to the pharmacy.

## 2023-06-10 ASSESSMENT — ENCOUNTER SYMPTOMS
CHEST TIGHTNESS: 0
ABDOMINAL PAIN: 0
NAUSEA: 0
EYE REDNESS: 0
PHOTOPHOBIA: 0
EYE PAIN: 0
DIARRHEA: 0
BACK PAIN: 0
ABDOMINAL DISTENTION: 0
SORE THROAT: 0
VOMITING: 0
SINUS PRESSURE: 0
COLOR CHANGE: 1
WHEEZING: 0
COUGH: 0
SINUS PAIN: 0
SHORTNESS OF BREATH: 0
EYE ITCHING: 0
EYE DISCHARGE: 0

## 2023-07-04 DIAGNOSIS — E11.9 TYPE 2 DIABETES MELLITUS WITHOUT COMPLICATION, WITHOUT LONG-TERM CURRENT USE OF INSULIN (HCC): ICD-10-CM

## 2023-07-05 RX ORDER — SITAGLIPTIN AND METFORMIN HYDROCHLORIDE 500; 50 MG/1; MG/1
TABLET, FILM COATED ORAL
Qty: 180 TABLET | Refills: 3 | OUTPATIENT
Start: 2023-07-05

## 2023-07-05 NOTE — TELEPHONE ENCOUNTER
LV 6/9/23 WITH LG FOR DM NV NONE  JANUMET  MG per tablet 180 tablet 0 6/9/2023     Sig - Route:  Take 1 tablet by mouth 2 times daily (with meals) - Oral    Sent to pharmacy as: Janumet  MG Oral Tablet    E-Prescribing Status: Receipt confirmed by pharmacy (6/9/2023  4:56 PM EDT)    REFILL TOO SOON

## 2023-07-18 ENCOUNTER — OFFICE VISIT (OUTPATIENT)
Dept: FAMILY MEDICINE CLINIC | Age: 63
End: 2023-07-18
Payer: MEDICARE

## 2023-07-18 VITALS
BODY MASS INDEX: 31.56 KG/M2 | SYSTOLIC BLOOD PRESSURE: 110 MMHG | WEIGHT: 233 LBS | HEART RATE: 81 BPM | DIASTOLIC BLOOD PRESSURE: 72 MMHG | OXYGEN SATURATION: 98 % | HEIGHT: 72 IN

## 2023-07-18 DIAGNOSIS — L24.4 IRRITANT CONTACT DERMATITIS DUE TO DRUG IN CONTACT WITH SKIN: Primary | ICD-10-CM

## 2023-07-18 PROCEDURE — G8427 DOCREV CUR MEDS BY ELIG CLIN: HCPCS

## 2023-07-18 PROCEDURE — G8417 CALC BMI ABV UP PARAM F/U: HCPCS

## 2023-07-18 PROCEDURE — 3078F DIAST BP <80 MM HG: CPT

## 2023-07-18 PROCEDURE — 1123F ACP DISCUSS/DSCN MKR DOCD: CPT

## 2023-07-18 PROCEDURE — 99213 OFFICE O/P EST LOW 20 MIN: CPT

## 2023-07-18 PROCEDURE — 3074F SYST BP LT 130 MM HG: CPT

## 2023-07-18 PROCEDURE — 1036F TOBACCO NON-USER: CPT

## 2023-07-18 PROCEDURE — 3017F COLORECTAL CA SCREEN DOC REV: CPT

## 2023-07-18 RX ORDER — TRIAMCINOLONE ACETONIDE 0.25 MG/G
CREAM TOPICAL
Qty: 1 EACH | Refills: 1 | Status: SHIPPED | OUTPATIENT
Start: 2023-07-18

## 2023-07-18 ASSESSMENT — ENCOUNTER SYMPTOMS
SHORTNESS OF BREATH: 0
EYE ITCHING: 0
VOMITING: 0
NAUSEA: 0
COUGH: 0
BACK PAIN: 0
COLOR CHANGE: 0
WHEEZING: 0
DIARRHEA: 0
PHOTOPHOBIA: 0
EYE PAIN: 0
CHEST TIGHTNESS: 0

## 2023-07-18 NOTE — PATIENT INSTRUCTIONS

## 2023-07-18 NOTE — PROGRESS NOTES
Nallely Cortez (:  1960) is a 70 y.o. male,Established patient, here for evaluation of the following chief complaint(s):  Laceration (Cut leg on memorial day, 16 stiches, antibiotics. Now has bumps around it. Not sure if its allergic reaction, bites. Redness and swelling )         ASSESSMENT/PLAN:  1. Irritant contact dermatitis due to drug in contact with skin  -Irritant dermatitis over healed laceration.  -Recommend avoiding possible irritants. Discussed that current risk of infection is significantly lower than previously now that wound is well approximated and closed. -Treatment options discussed. Kenalog is being sent to the pharmacy. The medication uses and side effects were discussed with the patient. Patient verbalized understanding and agrees to the plan. -Follow-up as needed. -     triamcinolone (KENALOG) 0.025 % cream; Apply topically 2 times daily. , Disp-1 each, R-1, Normal      Return if symptoms worsen or fail to improve. Subjective   SUBJECTIVE/OBJECTIVE:  HPI  He did well going on vacation to [place]. He went fishing with his grandson, and his wife put a different salve on his ankle been previously. His neighbor who is a Dr said it looks like an allergic reaction. He did not need the antibiotic for the trip, but his wife had him take an extra dose last night just to be safe. He has some end of day right leg swelling, and wearing a compression stocking helps. He denies fever, chills, heat, or decreased circulation. Review of Systems   Constitutional:  Negative for chills, diaphoresis, fatigue and fever. Eyes:  Negative for photophobia, pain, itching and visual disturbance. Respiratory:  Negative for cough, chest tightness, shortness of breath and wheezing. Cardiovascular:  Negative for chest pain and palpitations. Gastrointestinal:  Negative for diarrhea, nausea and vomiting. Endocrine: Negative for cold intolerance and heat intolerance.    Musculoskeletal:

## 2023-07-27 ENCOUNTER — TELEPHONE (OUTPATIENT)
Dept: FAMILY MEDICINE CLINIC | Age: 63
End: 2023-07-27

## 2023-07-27 NOTE — TELEPHONE ENCOUNTER
Patient  need to speak with someone concerning his medication JANUMET 500 MG - HE IS NOT SURE IF IT SHOULD BE TAKING 500 MG. PLEASE GIVE HIM A CALL BACK.

## 2023-10-12 DIAGNOSIS — E11.9 TYPE 2 DIABETES MELLITUS WITHOUT COMPLICATION, WITHOUT LONG-TERM CURRENT USE OF INSULIN (HCC): ICD-10-CM

## 2023-10-13 RX ORDER — SITAGLIPTIN AND METFORMIN HYDROCHLORIDE 500; 50 MG/1; MG/1
1 TABLET, FILM COATED ORAL 2 TIMES DAILY WITH MEALS
Qty: 180 TABLET | Refills: 0 | Status: SHIPPED | OUTPATIENT
Start: 2023-10-13

## 2023-10-13 RX ORDER — LOSARTAN POTASSIUM 100 MG/1
100 TABLET ORAL DAILY
Qty: 90 TABLET | Refills: 3 | Status: SHIPPED | OUTPATIENT
Start: 2023-10-13

## 2023-10-16 DIAGNOSIS — F41.1 ANXIETY STATE: ICD-10-CM

## 2023-10-16 RX ORDER — ROSUVASTATIN CALCIUM 5 MG/1
5 TABLET, COATED ORAL DAILY
Qty: 90 TABLET | Refills: 0 | Status: SHIPPED | OUTPATIENT
Start: 2023-10-16

## 2023-10-16 NOTE — TELEPHONE ENCOUNTER
Pt is calling to get a refill on his    Sertraline 50 mg #30   but can we do the 90 day? Rosuvastatin 5 mg #30  but can we do the 90 day ?     Please send to [address]

## 2023-10-27 ENCOUNTER — NURSE ONLY (OUTPATIENT)
Dept: FAMILY MEDICINE CLINIC | Age: 63
End: 2023-10-27
Payer: MEDICARE

## 2023-10-27 DIAGNOSIS — E11.9 TYPE 2 DIABETES MELLITUS WITHOUT COMPLICATION, WITHOUT LONG-TERM CURRENT USE OF INSULIN (HCC): Primary | ICD-10-CM

## 2023-10-27 DIAGNOSIS — Z23 NEED FOR IMMUNIZATION AGAINST INFLUENZA: ICD-10-CM

## 2023-10-27 LAB — HBA1C MFR BLD: 6.1 %

## 2023-10-27 PROCEDURE — G0008 ADMIN INFLUENZA VIRUS VAC: HCPCS

## 2023-10-27 PROCEDURE — 90694 VACC AIIV4 NO PRSRV 0.5ML IM: CPT

## 2023-10-27 PROCEDURE — 83036 HEMOGLOBIN GLYCOSYLATED A1C: CPT

## 2023-10-27 NOTE — PROGRESS NOTES
Immunizations Administered       Name Date Dose Route    Influenza, FLUAD, (age 72 y+), Adjuvanted, 0.5mL 10/27/2023 0.5 mL Intramuscular    Site: Dorsogluteal- Right    Lot: [id number]    NDC: 14674-520-64          1 white top-UMALB Sent./mv    POCT A1c today was 6.1, pt informed./mv

## 2023-10-28 LAB
CREAT UR-MCNC: 181.7 MG/DL (ref 39–259)
MICROALBUMIN UR DL<=1MG/L-MCNC: 1.7 MG/DL
MICROALBUMIN/CREAT UR: 9.4 MG/G (ref 0–30)

## 2023-12-07 ENCOUNTER — OFFICE VISIT (OUTPATIENT)
Dept: FAMILY MEDICINE CLINIC | Age: 63
End: 2023-12-07
Payer: MEDICARE

## 2023-12-07 VITALS
DIASTOLIC BLOOD PRESSURE: 80 MMHG | HEART RATE: 78 BPM | SYSTOLIC BLOOD PRESSURE: 132 MMHG | BODY MASS INDEX: 32.43 KG/M2 | RESPIRATION RATE: 16 BRPM | WEIGHT: 239.4 LBS | HEIGHT: 72 IN | OXYGEN SATURATION: 97 %

## 2023-12-07 DIAGNOSIS — S81.811D LACERATION OF RIGHT LOWER LEG, SUBSEQUENT ENCOUNTER: ICD-10-CM

## 2023-12-07 DIAGNOSIS — E11.9 TYPE 2 DIABETES MELLITUS WITHOUT COMPLICATION, WITHOUT LONG-TERM CURRENT USE OF INSULIN (HCC): Primary | ICD-10-CM

## 2023-12-07 PROCEDURE — 3017F COLORECTAL CA SCREEN DOC REV: CPT

## 2023-12-07 PROCEDURE — G8484 FLU IMMUNIZE NO ADMIN: HCPCS

## 2023-12-07 PROCEDURE — 2022F DILAT RTA XM EVC RTNOPTHY: CPT

## 2023-12-07 PROCEDURE — 3075F SYST BP GE 130 - 139MM HG: CPT

## 2023-12-07 PROCEDURE — G8427 DOCREV CUR MEDS BY ELIG CLIN: HCPCS

## 2023-12-07 PROCEDURE — G8417 CALC BMI ABV UP PARAM F/U: HCPCS

## 2023-12-07 PROCEDURE — 1036F TOBACCO NON-USER: CPT

## 2023-12-07 PROCEDURE — 1123F ACP DISCUSS/DSCN MKR DOCD: CPT

## 2023-12-07 PROCEDURE — 3079F DIAST BP 80-89 MM HG: CPT

## 2023-12-07 PROCEDURE — 99214 OFFICE O/P EST MOD 30 MIN: CPT

## 2023-12-07 PROCEDURE — 3044F HG A1C LEVEL LT 7.0%: CPT

## 2023-12-07 ASSESSMENT — ENCOUNTER SYMPTOMS
WHEEZING: 0
EYE PAIN: 0
CONSTIPATION: 0
VOMITING: 0
CHEST TIGHTNESS: 0
NAUSEA: 0
COLOR CHANGE: 0
SHORTNESS OF BREATH: 0
COUGH: 0
ABDOMINAL DISTENTION: 0
BACK PAIN: 0
DIARRHEA: 0
ABDOMINAL PAIN: 0
EYE ITCHING: 0
PHOTOPHOBIA: 0

## 2023-12-07 NOTE — PROGRESS NOTES
Liv Gay (:  1960) is a 70 y.o. male,Established patient, here for evaluation of the following chief complaint(s):  Diabetes (Follow up for DM )         ASSESSMENT/PLAN:  1. Type 2 diabetes mellitus without complication, without long-term current use of insulin (HCC)  -Previous A1c of 6.1%. Diabetes well-controlled with Janumet and some lifestyle modification.  -Health goals: Weight loss goal of 5-10% of your current body weight with 1-2 pounds of weight loss per week; drink at least 64 ounces of water a day, exercise at least 150 minutes/week, sleep between 6 to 10 hours nightly, consume approximately 2,000 calories daily, and limit toxins in the diet (alcohol, drugs, smoking). -Continue Janumet twice daily.  -Follow-up in 6 months for annual wellness visit with fasting blood work, diabetes  2. Laceration of right lower leg, subsequent encounter  -Well-healed with rash retired. Recommending OTC emollients, hydrocortisone cream.  -No suspicion of infection.  -Follow-up if no improvement. Return in about 6 months (around 2024) for Annual Wellness Visit, Diabetes Follow-up, Fasting Labs. Subjective   SUBJECTIVE/OBJECTIVE:  HPI  He has been doing pretty well. His leg has been a bit red, but no warmth, swelling, discharge, or pain. It occasionally is itchy. His wife applies a lotion or topical emollient from time to time. He is taking janumet BID. He is going to the gym more, treadmill and weights across 60 minutes 3x weekly, but diet could be improved. Likes fast food, candy, chocolate, etc. He has been trying wheat or rye bread. He is Jamal, so he likes pasta. He can feel off if he has a high blood sugar. Sleep is going well, no issue. About 10 hours a night, no snoring or gasping for air. Energy is doing good, better with the gym. He takes a vitamin D as well. He has been working on his cars. He is considering volunteering. Lots of stress relief from retiring.   Overall he has no

## 2024-01-17 DIAGNOSIS — F41.1 ANXIETY STATE: ICD-10-CM

## 2024-01-17 DIAGNOSIS — E11.9 TYPE 2 DIABETES MELLITUS WITHOUT COMPLICATION, WITHOUT LONG-TERM CURRENT USE OF INSULIN (HCC): ICD-10-CM

## 2024-01-17 RX ORDER — SITAGLIPTIN AND METFORMIN HYDROCHLORIDE 500; 50 MG/1; MG/1
1 TABLET, FILM COATED ORAL 2 TIMES DAILY WITH MEALS
Qty: 180 TABLET | Refills: 0 | Status: SHIPPED | OUTPATIENT
Start: 2024-01-17

## 2024-01-17 NOTE — TELEPHONE ENCOUNTER
Patient was calling to get medication refilled.    Janumet 500 MG, 2x's daily, 90 day supply     Sertraline 50 MG, 1x daily, 90 day supply     [facility]   Phone number

## 2024-01-22 RX ORDER — ROSUVASTATIN CALCIUM 5 MG/1
5 TABLET, COATED ORAL DAILY
Qty: 90 TABLET | Refills: 0 | Status: SHIPPED | OUTPATIENT
Start: 2024-01-22

## 2024-01-22 NOTE — TELEPHONE ENCOUNTER
Patient was calling to get a medication refilled.    Rosuvastatin 5 MG, 1x daily, 90 days     [facility]   Phone number

## 2024-03-18 ENCOUNTER — TELEPHONE (OUTPATIENT)
Dept: FAMILY MEDICINE CLINIC | Age: 64
End: 2024-03-18

## 2024-03-18 NOTE — TELEPHONE ENCOUNTER
Pt has been seeing Esteban .    Pt Blood Sugars has been running 190's to the low 200's.  In the mornings.  Pt would like to come in to be seen but April 11 was the first available for a Diabetic.  He is going out of the country next month and would like to know everything is Alright.      Please call

## 2024-03-18 NOTE — TELEPHONE ENCOUNTER
Called Px to schedule an appointment.  Unable to leave a message.  Appts available on 4/1 when called.  AM

## 2024-04-01 ENCOUNTER — OFFICE VISIT (OUTPATIENT)
Dept: FAMILY MEDICINE CLINIC | Age: 64
End: 2024-04-01
Payer: MEDICARE

## 2024-04-01 VITALS
SYSTOLIC BLOOD PRESSURE: 136 MMHG | HEIGHT: 72 IN | DIASTOLIC BLOOD PRESSURE: 76 MMHG | OXYGEN SATURATION: 97 % | WEIGHT: 243 LBS | BODY MASS INDEX: 32.91 KG/M2 | HEART RATE: 88 BPM

## 2024-04-01 DIAGNOSIS — F41.1 ANXIETY STATE: ICD-10-CM

## 2024-04-01 DIAGNOSIS — E78.00 PURE HYPERCHOLESTEROLEMIA: ICD-10-CM

## 2024-04-01 DIAGNOSIS — N52.02 CORPORO-VENOUS OCCLUSIVE ERECTILE DYSFUNCTION: ICD-10-CM

## 2024-04-01 DIAGNOSIS — I10 PRIMARY HYPERTENSION: ICD-10-CM

## 2024-04-01 DIAGNOSIS — E11.9 TYPE 2 DIABETES MELLITUS WITHOUT COMPLICATION, WITHOUT LONG-TERM CURRENT USE OF INSULIN (HCC): Primary | ICD-10-CM

## 2024-04-01 LAB — HBA1C MFR BLD: 5.8 %

## 2024-04-01 PROCEDURE — 1036F TOBACCO NON-USER: CPT | Performed by: NURSE PRACTITIONER

## 2024-04-01 PROCEDURE — G8417 CALC BMI ABV UP PARAM F/U: HCPCS | Performed by: NURSE PRACTITIONER

## 2024-04-01 PROCEDURE — 83036 HEMOGLOBIN GLYCOSYLATED A1C: CPT | Performed by: NURSE PRACTITIONER

## 2024-04-01 PROCEDURE — 3017F COLORECTAL CA SCREEN DOC REV: CPT | Performed by: NURSE PRACTITIONER

## 2024-04-01 PROCEDURE — 2022F DILAT RTA XM EVC RTNOPTHY: CPT | Performed by: NURSE PRACTITIONER

## 2024-04-01 PROCEDURE — 99214 OFFICE O/P EST MOD 30 MIN: CPT | Performed by: NURSE PRACTITIONER

## 2024-04-01 PROCEDURE — G8427 DOCREV CUR MEDS BY ELIG CLIN: HCPCS | Performed by: NURSE PRACTITIONER

## 2024-04-01 PROCEDURE — 3075F SYST BP GE 130 - 139MM HG: CPT | Performed by: NURSE PRACTITIONER

## 2024-04-01 PROCEDURE — 3044F HG A1C LEVEL LT 7.0%: CPT | Performed by: NURSE PRACTITIONER

## 2024-04-01 PROCEDURE — 3078F DIAST BP <80 MM HG: CPT | Performed by: NURSE PRACTITIONER

## 2024-04-01 PROCEDURE — 1123F ACP DISCUSS/DSCN MKR DOCD: CPT | Performed by: NURSE PRACTITIONER

## 2024-04-01 RX ORDER — ROSUVASTATIN CALCIUM 5 MG/1
5 TABLET, COATED ORAL DAILY
Qty: 90 TABLET | Refills: 1 | Status: SHIPPED | OUTPATIENT
Start: 2024-04-01

## 2024-04-01 RX ORDER — TADALAFIL 10 MG/1
10-20 TABLET ORAL DAILY PRN
Qty: 30 TABLET | Refills: 2 | Status: SHIPPED | OUTPATIENT
Start: 2024-04-01

## 2024-04-01 RX ORDER — LOSARTAN POTASSIUM 100 MG/1
100 TABLET ORAL DAILY
Qty: 90 TABLET | Refills: 1 | Status: SHIPPED | OUTPATIENT
Start: 2024-04-01

## 2024-04-01 RX ORDER — SITAGLIPTIN AND METFORMIN HYDROCHLORIDE 500; 50 MG/1; MG/1
1 TABLET, FILM COATED ORAL 2 TIMES DAILY WITH MEALS
Qty: 180 TABLET | Refills: 1 | Status: SHIPPED | OUTPATIENT
Start: 2024-04-01

## 2024-04-01 ASSESSMENT — PATIENT HEALTH QUESTIONNAIRE - PHQ9
1. LITTLE INTEREST OR PLEASURE IN DOING THINGS: NOT AT ALL
2. FEELING DOWN, DEPRESSED OR HOPELESS: NOT AT ALL
SUM OF ALL RESPONSES TO PHQ QUESTIONS 1-9: 0
SUM OF ALL RESPONSES TO PHQ9 QUESTIONS 1 & 2: 0
SUM OF ALL RESPONSES TO PHQ QUESTIONS 1-9: 0

## 2024-04-01 ASSESSMENT — ENCOUNTER SYMPTOMS
EYE PAIN: 0
NAUSEA: 0
EYE REDNESS: 0
SINUS PRESSURE: 0
DIARRHEA: 0
ABDOMINAL PAIN: 0
VOMITING: 0
SORE THROAT: 0
WHEEZING: 0
ABDOMINAL DISTENTION: 0
SHORTNESS OF BREATH: 0
SINUS PAIN: 0
COUGH: 0

## 2024-04-01 NOTE — PROGRESS NOTES
Michael Carter (:  1960) is a 63 y.o. male,Established patient, here for evaluation of the following chief complaint(s):  Diabetes (Follow up )      ASSESSMENT/PLAN:  1. Type 2 diabetes mellitus without complication, without long-term current use of insulin ([facility])  -A1c 5.8% today.  Continues to be well-controlled.  Given recent hyperglycemia, encouraged the patient to monitor her sugar closely.  May be too soon to see reflection with A1c.  If hyperglycemia is persistent, contact the office and can switch to Synjardy.  Follow-up in 3 months for Medicare annual wellness visit, or sooner if needed.  -Encouraged diabetic eye exam  -     POCT glycosylated hemoglobin (Hb A1C)  -     Diabetic Foot Exam  -     JANUMET  MG per tablet; Take 1 tablet by mouth with breakfast and with evening meal, Disp-180 tablet, R-1, DAWNormal  2. Corporo-venous occlusive erectile dysfunction  -Discussed options.  Starting tadalafil.  Educated on medication use as well as side effects.  Follow-up as needed.  -     tadalafil (CIALIS) 10 MG tablet; Take 1-2 tablets by mouth daily as needed for Erectile Dysfunction, Disp-30 tablet, R-2Normal  3. Anxiety state  -Controlled on current dose of sertraline.  No changes today.  Refill.  -     sertraline (ZOLOFT) 50 MG tablet; TAKE 1 TABLET DAILY, Disp-90 tablet, R-1Normal  4. Primary hypertension  -Controlled on current dose of losartan.  No changes today.  Refill.  -     losartan (COZAAR) 100 MG tablet; Take 1 tablet by mouth daily, Disp-90 tablet, R-1Normal  5. Pure hypercholesterolemia  -Controlled on current dose of rosuvastatin.  No changes today.  Refill.  Obtain labs at next appointment  -     rosuvastatin (CRESTOR) 5 MG tablet; Take 1 tablet by mouth daily, Disp-90 tablet, R-1Normal      Return in about 3 months (around 2024) for Medicare Annual Wellness Visit/Fasting Labs/A1C.    SUBJECTIVE/OBJECTIVE:  SHANAE  Alfredo presents today for diabetes follow-up.  He does have some

## 2024-04-01 NOTE — PATIENT INSTRUCTIONS
Ok to send in albuterol inhaler?   You may receive a survey regarding the care you received during your visit.  Your input is valuable to us.  We encourage you to complete and return your survey.  We hope you will choose us in the future for your healthcare needs. GENERAL OFFICE POLICIES      Telephone Calls: Messages will be answered within 1-2 business days, unless the provider is out of the office.  If it is urgent a covering provider will answer. (this does not include Medication refills).    MyChart:  We recommend all patients sign up for [employer].  Through this portal you can see your lab results, request refills, schedule appointments, pay your bill and send messages to the office.   [employer] messages will be answered within 1-2 business days unless the provider is out of the office.  For urgent matters, please call the office.  Appointments:  All appointments must be scheduled.  We ask all patients to schedule their next follow up appointment before they leave the office to make sure you will be able to be seen before you run out of medications.  24 hours notice is required to cancel or reschedule an appointment to avoid being marked as a no show.  You may be dismissed from the practice after 3 no shows.    LATE for Appointment: If you are 15 or more minutes late for your appointment, you may be asked to reschedule.  MA/LAB APPTS: Must be scheduled, cannot accept walk in lab visits.  We only draw labs for patients established in our office.  We only do injections for medications ordered by our office.  Acute Sick Visits:  Nothing other than acute complaint will be addressed at this visit.  TRADITIONAL MEDICARE  DOES NOT COVER PHYSICALS  MEDICARE WELLNESS VISITS: These are NOT physicals but the free annual visit offered by Medicare to discuss wellness issues. Medication refills, checkups, etc. will not be addressed during this visit.  Medication Refills: Refills are handled electronically so please contact your pharmacy for medication refills

## 2024-04-11 ENCOUNTER — TELEPHONE (OUTPATIENT)
Dept: FAMILY MEDICINE CLINIC | Age: 64
End: 2024-04-11

## 2024-04-11 DIAGNOSIS — E11.9 TYPE 2 DIABETES MELLITUS WITHOUT COMPLICATION, WITHOUT LONG-TERM CURRENT USE OF INSULIN (HCC): Primary | ICD-10-CM

## 2024-04-11 NOTE — TELEPHONE ENCOUNTER
Michael was told by the pharmacy to get a order for a patch    Free Style Lobre - model #2 - He needs the sensor monitor kit & devices    [facility] -  [address] - Phone no. [phone]    Please give him a call back.

## 2024-04-12 NOTE — TELEPHONE ENCOUNTER
SPOKE TO PHARMACIST NO PRESCRIPTION IS NEEDED ITS NOT SOMETHING INSURANCE WILL PAY FOR. HE HAS TO PAY FOR THEM AND SHE IS GOING TO CALL HIM AND LET HIM KNOW THIS.KW

## 2024-04-12 NOTE — TELEPHONE ENCOUNTER
Patient was calling back because he needs the Patches and sensors as well. Free Style Lobre - model #2     The pharmacy said we only ordered the monitor and that does him no good.       [facility]   [address] - Phone no. [phone]     Can we please give him a call

## 2024-04-17 ENCOUNTER — TELEPHONE (OUTPATIENT)
Dept: ADMINISTRATIVE | Age: 64
End: 2024-04-17

## 2024-04-17 NOTE — TELEPHONE ENCOUNTER
Submitted PA for FreeStyle Guevara 2 [place] device   Via CMM Key: [id number] STATUS: Denied. FREESTYLE GUEVARA 2 READER Device is a medical supply. It is not directly related to the delivery of insulin to the body. Medicare Part B covers medical supplies that are not directly related to the delivery of insulin to the body. Test strips, lancets, and needle disposal systems are included in this category. They are covered by Medicare Part B. We cannot pay for drugs under Medicare Part D if they are covered under Medicare Part A or B. We did not decide whether FREESTYLE GUEVARA 2 READER Device is medically necessary. We made our decision only on the fact that we cannot pay for it under Medicare Part D. For more information, talk to your prescriber or call 1-800-MEDICARE.     Please notify patient. Thank you.

## 2024-04-17 NOTE — TELEPHONE ENCOUNTER
Since he is medicate the berto needs to go through a medical device company. See where he wants to go.

## 2024-04-17 NOTE — TELEPHONE ENCOUNTER
Called and informed pt, he is unsure where he would like it sent. He will call us back with information.

## 2024-04-22 ENCOUNTER — TELEPHONE (OUTPATIENT)
Dept: FAMILY MEDICINE CLINIC | Age: 64
End: 2024-04-22

## 2024-04-22 DIAGNOSIS — E11.9 TYPE 2 DIABETES MELLITUS WITHOUT COMPLICATION, WITHOUT LONG-TERM CURRENT USE OF INSULIN (HCC): ICD-10-CM

## 2024-04-22 NOTE — TELEPHONE ENCOUNTER
Patient talked to his insurance and they told him we need to fill out the proper paperwork for him to be able to get the reader & sensor for his DM. Please give him a call back.     [facility] [address] - phone no. [phone]    Medicare said we need to say this is medical necessity.

## 2024-04-23 NOTE — TELEPHONE ENCOUNTER
SPOKE TO PHARMACY, THEY SOLD HIM ONE THROUGH [facility]. BUT THERE IS A FORM WE CAN FILL OUT AND THE PHARMACY CAN FAX IT TO THEIR MEDICARE BILLING TEAM TO SEE IF THIS IS APPROVED, HOWEVER THIS PROCESS TAKES A WHILE AND THERE IS MOST LIKELY NO WAY OF GETTING THEM TO REVIEW THIS OR APPROVE THIS BY FRIDAY. WILL WAIT FOR FORM AND FILL IT OUT AND GET IT SENT BACK TO THEM.

## 2024-04-23 NOTE — TELEPHONE ENCOUNTER
Called pt, he stated he called Medicare yesterday, he has a $240 deductible and after paying this Medicare will pay for 80% and requested that we do the proper ppw in order for Medicare to pay for this. He needs this filled before 04/26.

## 2024-04-24 ENCOUNTER — TELEPHONE (OUTPATIENT)
Dept: FAMILY MEDICINE CLINIC | Age: 64
End: 2024-04-24

## 2024-04-24 NOTE — TELEPHONE ENCOUNTER
Patient is going out of town from  to May 7th and he needs more sensor, the other sensor he receive will  on 24. Please give him a call.    Medicare told him that we need to fill out the proper paperwork.    [facility] -[address] - Phone no.[phone]

## 2024-04-24 NOTE — TELEPHONE ENCOUNTER
Other (Sensor )  (Newest Message First)  View All Conversations on this Encounter  Jaja Grider routed conversation to [facility] Practice Staff7 minutes ago (4:24 PM)     Jaja Grider7 minutes ago (4:24 PM)     [place]  Patient is going out of town from  to May 7th and he needs more sensor, the other sensor he receive will  on 24. Please give him a call.     Medicare told him that we need to fill out the proper paperwork.     [facility] -[address] - Phone no.[phone]                  Note        Michael Carter [phone]  Jaja Grider

## 2024-04-25 DIAGNOSIS — E11.9 TYPE 2 DIABETES MELLITUS WITHOUT COMPLICATION, WITHOUT LONG-TERM CURRENT USE OF INSULIN (HCC): ICD-10-CM

## 2024-04-25 NOTE — TELEPHONE ENCOUNTER
CALLED [facility] AND SPOKE TO EHSAN. SHE STATES THEY CANNOT SEND US ANY PAPERWORK, BECAUSE [facility] IS NOT A CARRIER OR PROVIDER FOR THE [employer] [place]. PATIENT WOULD NEED TO HAVE DEXCOM SENSOR AND READER. THEY ARE UNABLE TO SEND THE FORMS TO BE FILLED OUT. SC

## 2024-04-25 NOTE — TELEPHONE ENCOUNTER
Pt is calling to see if we can send in a prescription to the pharmacy for FreeStyle Guevara ll Sensors--he has the reader.  He will need 1 sensor is for 14 days--it will cost him 44.00 .    Please send to [facility] [phone]

## 2024-04-25 NOTE — TELEPHONE ENCOUNTER
SPOKE TO PHARMACY AND CALLED IN THE SENSOR NOT READER THEY ARE GETTING IT READY NOW AND CONFIRMED 44$ COST. I CALLED MEDICARE AND TALKED TO 5 DIFFERENT PEOPLE NO ONE HAD ANY IDEA WHAT FORM WAS NEEDED. AND THE STATED HE HAS TO FILL THIS WITH A DME PROVIDER FOR IT TO BE COVERED. BUT ALSO NEEDS TO BE ON INSULIN. I CALLED PT AND EXPLAINED IT. ALSO LET HIM KNOW THAT WHEN HE GETS BACK IN TOWN IF HE WOULD LIKE US TO TRY AND GET THIS APPROVED THROUGH A DME WE WOULD BE MORE THAN HAPPY TO GIVE IT A SHOT.KW

## 2024-05-10 ENCOUNTER — OFFICE VISIT (OUTPATIENT)
Dept: FAMILY MEDICINE CLINIC | Age: 64
End: 2024-05-10
Payer: MEDICARE

## 2024-05-10 VITALS
WEIGHT: 238.2 LBS | SYSTOLIC BLOOD PRESSURE: 132 MMHG | HEART RATE: 86 BPM | DIASTOLIC BLOOD PRESSURE: 76 MMHG | BODY MASS INDEX: 32.26 KG/M2 | OXYGEN SATURATION: 97 % | HEIGHT: 72 IN

## 2024-05-10 DIAGNOSIS — J02.9 SORE THROAT: Primary | ICD-10-CM

## 2024-05-10 LAB
INFLUENZA A ANTIGEN, POC: NEGATIVE
INFLUENZA B ANTIGEN, POC: NEGATIVE
LOT EXPIRE DATE: NORMAL
LOT KIT NUMBER: NORMAL
S PYO AG THROAT QL: NORMAL
SARS-COV-2, POC: NORMAL
VALID INTERNAL CONTROL: YES
VENDOR AND KIT NAME POC: NORMAL

## 2024-05-10 PROCEDURE — 99213 OFFICE O/P EST LOW 20 MIN: CPT

## 2024-05-10 PROCEDURE — 3078F DIAST BP <80 MM HG: CPT

## 2024-05-10 PROCEDURE — G8417 CALC BMI ABV UP PARAM F/U: HCPCS

## 2024-05-10 PROCEDURE — 87880 STREP A ASSAY W/OPTIC: CPT

## 2024-05-10 PROCEDURE — 1123F ACP DISCUSS/DSCN MKR DOCD: CPT

## 2024-05-10 PROCEDURE — 3075F SYST BP GE 130 - 139MM HG: CPT

## 2024-05-10 PROCEDURE — 3017F COLORECTAL CA SCREEN DOC REV: CPT

## 2024-05-10 PROCEDURE — G8427 DOCREV CUR MEDS BY ELIG CLIN: HCPCS

## 2024-05-10 PROCEDURE — 1036F TOBACCO NON-USER: CPT

## 2024-05-10 PROCEDURE — 87428 SARSCOV & INF VIR A&B AG IA: CPT

## 2024-05-10 ASSESSMENT — ENCOUNTER SYMPTOMS
DIARRHEA: 0
CHEST TIGHTNESS: 0
COUGH: 1
VOICE CHANGE: 1
RHINORRHEA: 1
EYE DISCHARGE: 0
SHORTNESS OF BREATH: 0
EYE REDNESS: 0
SINUS PRESSURE: 0
SORE THROAT: 1
SINUS PAIN: 0
WHEEZING: 0
NAUSEA: 0
TROUBLE SWALLOWING: 0
VOMITING: 0
EYE ITCHING: 0

## 2024-05-10 NOTE — PATIENT INSTRUCTIONS
at time of . Instead of the fee, you can choose to have the paperwork filled out during a separate office visit that is for filling out the paperwork only.  Medication Samples:  This office does not carry medication samples.  If you need assistance in getting your medications, then please let the medical assistant know so they can help you sign up for a drug assistance program that can help get medications at a reduced cost or even free (if you qualify).  Workman's Comp Claims: We do not handle workman's comp cases or claims. You will need to go to an urgent care to be seen or to whomever your employer uses.  General - Any abusive/rude behavior toward staff/providers may be cause for dismissal.      WE NOW OFFER [employer] SELF-SCHEDULING   IN 3 EASY STEPS    SCHEDULE AN APPT AT YOUR CONVENIENCE WITH NO HOLD/WAIT TIME  IN THE [employer] MARIA TERESA SELECT 'SCHEDULE AN APPOINTMENT' FROM THE MENU  CHOOSE DATE/TIME THAT WORKS FOR YOU    If you don't find an appointment time that works for your schedule, you can also submit an appointment request thru [facility].    CONVENIENT QUALITY CARE AT YOUR FINGERTIPS    NOT ON [employer]?  PLEASE ASK ANY STAFF MEMBER You may receive a survey regarding the care you received during your visit.  Your input is valuable to us.  We encourage you to complete and return your survey.  We hope you will choose us in the future for your healthcare needs.

## 2024-05-10 NOTE — PROGRESS NOTES
sounds: Normal heart sounds. No murmur heard.  Pulmonary:      Effort: Pulmonary effort is normal. No respiratory distress.      Breath sounds: Normal breath sounds. No wheezing, rhonchi or rales.   Lymphadenopathy:      Cervical: No cervical adenopathy.   Skin:     General: Skin is warm and dry.      Coloration: Skin is not pale.      Findings: No erythema, lesion or rash.   Neurological:      Mental Status: He is alert and oriented to person, place, and time.   Psychiatric:         Mood and Affect: Mood normal.         Behavior: Behavior normal.         Thought Content: Thought content normal.         Judgment: Judgment normal.                  An electronic signature was used to authenticate this note.    --Luke A Glischinski, APRN - CNP       Please note that this chart was generated using dragon dictation software.  Although every effort was made to ensure the accuracy of this automated transcription, some errors in transcription may have occurred.

## 2024-05-23 ENCOUNTER — TELEPHONE (OUTPATIENT)
Dept: FAMILY MEDICINE CLINIC | Age: 64
End: 2024-05-23

## 2024-05-23 RX ORDER — AMOXICILLIN AND CLAVULANATE POTASSIUM 875; 125 MG/1; MG/1
1 TABLET, FILM COATED ORAL 2 TIMES DAILY
Qty: 14 TABLET | Refills: 0 | Status: SHIPPED | OUTPATIENT
Start: 2024-05-23 | End: 2024-05-30

## 2024-05-23 NOTE — TELEPHONE ENCOUNTER
Given symptoms have been going on for more than a week, likely bacterial.  I will send Augmentin to pharmacy.

## 2024-05-23 NOTE — TELEPHONE ENCOUNTER
Patient saw Esteban 2 weeks ago and he is no better and him and his wife feels like this is a sinus infection. Still  in his head, and he still has drainage.     Please give him a call back.     [facility]- [address] - Phone no. [phone]    No fever, it is all in his head - he feels sluggish.

## 2024-10-09 DIAGNOSIS — E78.00 PURE HYPERCHOLESTEROLEMIA: ICD-10-CM

## 2024-10-09 DIAGNOSIS — F41.1 ANXIETY STATE: ICD-10-CM

## 2024-10-09 DIAGNOSIS — E11.9 TYPE 2 DIABETES MELLITUS WITHOUT COMPLICATION, WITHOUT LONG-TERM CURRENT USE OF INSULIN (HCC): ICD-10-CM

## 2024-10-09 DIAGNOSIS — I10 PRIMARY HYPERTENSION: ICD-10-CM

## 2024-10-10 ENCOUNTER — TELEPHONE (OUTPATIENT)
Dept: FAMILY MEDICINE CLINIC | Age: 64
End: 2024-10-10

## 2024-10-10 RX ORDER — LOSARTAN POTASSIUM 100 MG/1
100 TABLET ORAL DAILY
Qty: 90 TABLET | Refills: 1 | OUTPATIENT
Start: 2024-10-10

## 2024-10-10 RX ORDER — SITAGLIPTIN AND METFORMIN HYDROCHLORIDE 500; 50 MG/1; MG/1
TABLET, FILM COATED ORAL
Qty: 180 TABLET | Refills: 1 | OUTPATIENT
Start: 2024-10-10

## 2024-10-10 RX ORDER — ROSUVASTATIN CALCIUM 5 MG/1
5 TABLET, COATED ORAL DAILY
Qty: 90 TABLET | Refills: 0 | Status: SHIPPED | OUTPATIENT
Start: 2024-10-10

## 2024-10-10 RX ORDER — SITAGLIPTIN AND METFORMIN HYDROCHLORIDE 500; 50 MG/1; MG/1
1 TABLET, FILM COATED ORAL 2 TIMES DAILY WITH MEALS
Qty: 180 TABLET | Refills: 0 | Status: SHIPPED | OUTPATIENT
Start: 2024-10-10

## 2024-10-10 RX ORDER — LOSARTAN POTASSIUM 100 MG/1
100 TABLET ORAL DAILY
Qty: 90 TABLET | Refills: 0 | Status: SHIPPED | OUTPATIENT
Start: 2024-10-10

## 2024-10-10 RX ORDER — ROSUVASTATIN CALCIUM 5 MG/1
5 TABLET, COATED ORAL DAILY
Qty: 90 TABLET | Refills: 1 | OUTPATIENT
Start: 2024-10-10

## 2024-10-10 NOTE — TELEPHONE ENCOUNTER
Patient needs a refill for his JANUMET  MG- 2 tablets per day 90 day refill. He states the pharmacy told him the provider cancelled this medication and would like to know why it was declined.    [facility] [address]   Phone no. [phone]    Please give him a call back.

## 2024-10-10 NOTE — TELEPHONE ENCOUNTER
He was due for an appointment.  Looks like he scheduled this today and Laura sent the refill over.

## 2024-10-10 NOTE — TELEPHONE ENCOUNTER
Pt made an appt for his Medicare Annual wellness   For Nov. 21, 24 with Matthew.  Pt did not take any medications today he was out.

## 2024-10-10 NOTE — TELEPHONE ENCOUNTER
LV 4/1/24 WITH TR FOR DM NV NONE  Return in about 3 months (around 7/1/2024) for Medicare Annual Wellness Visit/Fasting Labs/A1C.   PLEASE CALL PT TO SCHEDULE APPT  ONCE SCHEDULED WE CAN SEND IN REFILL

## 2024-10-14 ENCOUNTER — TELEPHONE (OUTPATIENT)
Dept: FAMILY MEDICINE CLINIC | Age: 64
End: 2024-10-14

## 2024-10-14 NOTE — TELEPHONE ENCOUNTER
Received a call from the patient while on call. He has been experiencing right sided chest pain for the past day. No other symptoms. Does note that he recently had pulled his riding mower out of the shed. Not sure if he pulled something doing this. He was worried about possible gallbladder or other cause. He denies nausea. Denies affiliation with food. States that it feels like it is over the ribs as opposed to the abdomen. he can get a slight exacerbation of pain when he takes a deep breath, but nothing significant. Differential includes constant versus under strain pneumonia versus cholecystitis, but presentation symptoms are more consistent with a Wallace scald or. Did offer the patient to try to schedule for tomorrow. Declined that time. Will monitor symptoms. Follow up as needed.

## 2024-11-20 SDOH — ECONOMIC STABILITY: FOOD INSECURITY: WITHIN THE PAST 12 MONTHS, THE FOOD YOU BOUGHT JUST DIDN'T LAST AND YOU DIDN'T HAVE MONEY TO GET MORE.: NEVER TRUE

## 2024-11-20 SDOH — ECONOMIC STABILITY: FOOD INSECURITY: WITHIN THE PAST 12 MONTHS, YOU WORRIED THAT YOUR FOOD WOULD RUN OUT BEFORE YOU GOT MONEY TO BUY MORE.: NEVER TRUE

## 2024-11-20 SDOH — ECONOMIC STABILITY: INCOME INSECURITY: HOW HARD IS IT FOR YOU TO PAY FOR THE VERY BASICS LIKE FOOD, HOUSING, MEDICAL CARE, AND HEATING?: NOT HARD AT ALL

## 2024-11-20 SDOH — HEALTH STABILITY: PHYSICAL HEALTH: ON AVERAGE, HOW MANY DAYS PER WEEK DO YOU ENGAGE IN MODERATE TO STRENUOUS EXERCISE (LIKE A BRISK WALK)?: 2 DAYS

## 2024-11-20 SDOH — HEALTH STABILITY: PHYSICAL HEALTH: ON AVERAGE, HOW MANY MINUTES DO YOU ENGAGE IN EXERCISE AT THIS LEVEL?: 30 MIN

## 2024-11-20 ASSESSMENT — PATIENT HEALTH QUESTIONNAIRE - PHQ9
SUM OF ALL RESPONSES TO PHQ QUESTIONS 1-9: 0
2. FEELING DOWN, DEPRESSED OR HOPELESS: NOT AT ALL
1. LITTLE INTEREST OR PLEASURE IN DOING THINGS: NOT AT ALL
SUM OF ALL RESPONSES TO PHQ QUESTIONS 1-9: 0
SUM OF ALL RESPONSES TO PHQ9 QUESTIONS 1 & 2: 0

## 2024-11-20 ASSESSMENT — LIFESTYLE VARIABLES
HAVE YOU OR SOMEONE ELSE BEEN INJURED AS A RESULT OF YOUR DRINKING: NO
HOW OFTEN DURING THE LAST YEAR HAVE YOU HAD A FEELING OF GUILT OR REMORSE AFTER DRINKING: NEVER
HAVE YOU OR SOMEONE ELSE BEEN INJURED AS A RESULT OF YOUR DRINKING: NO
HOW OFTEN DO YOU HAVE A DRINK CONTAINING ALCOHOL: 2-4 TIMES A MONTH
HOW MANY STANDARD DRINKS CONTAINING ALCOHOL DO YOU HAVE ON A TYPICAL DAY: 3 OR 4
HOW OFTEN DURING THE LAST YEAR HAVE YOU HAD A FEELING OF GUILT OR REMORSE AFTER DRINKING: NEVER
HOW OFTEN DURING THE LAST YEAR HAVE YOU NEEDED AN ALCOHOLIC DRINK FIRST THING IN THE MORNING TO GET YOURSELF GOING AFTER A NIGHT OF HEAVY DRINKING: NEVER
HOW OFTEN DURING THE LAST YEAR HAVE YOU NEEDED AN ALCOHOLIC DRINK FIRST THING IN THE MORNING TO GET YOURSELF GOING AFTER A NIGHT OF HEAVY DRINKING: NEVER
HOW OFTEN DURING THE LAST YEAR HAVE YOU FAILED TO DO WHAT WAS NORMALLY EXPECTED FROM YOU BECAUSE OF DRINKING: NEVER
HAS A RELATIVE, FRIEND, DOCTOR, OR ANOTHER HEALTH PROFESSIONAL EXPRESSED CONCERN ABOUT YOUR DRINKING OR SUGGESTED YOU CUT DOWN: NO
HOW OFTEN DURING THE LAST YEAR HAVE YOU BEEN UNABLE TO REMEMBER WHAT HAPPENED THE NIGHT BEFORE BECAUSE YOU HAD BEEN DRINKING: NEVER
HOW OFTEN DURING THE LAST YEAR HAVE YOU FAILED TO DO WHAT WAS NORMALLY EXPECTED FROM YOU BECAUSE OF DRINKING: NEVER
HAS A RELATIVE, FRIEND, DOCTOR, OR ANOTHER HEALTH PROFESSIONAL EXPRESSED CONCERN ABOUT YOUR DRINKING OR SUGGESTED YOU CUT DOWN: NO
HOW MANY STANDARD DRINKS CONTAINING ALCOHOL DO YOU HAVE ON A TYPICAL DAY: 2
HOW OFTEN DO YOU HAVE A DRINK CONTAINING ALCOHOL: 3
HOW OFTEN DURING THE LAST YEAR HAVE YOU FOUND THAT YOU WERE NOT ABLE TO STOP DRINKING ONCE YOU HAD STARTED: NEVER
HOW OFTEN DURING THE LAST YEAR HAVE YOU BEEN UNABLE TO REMEMBER WHAT HAPPENED THE NIGHT BEFORE BECAUSE YOU HAD BEEN DRINKING: NEVER
HOW OFTEN DO YOU HAVE SIX OR MORE DRINKS ON ONE OCCASION: 2
HOW OFTEN DURING THE LAST YEAR HAVE YOU FOUND THAT YOU WERE NOT ABLE TO STOP DRINKING ONCE YOU HAD STARTED: NEVER

## 2024-11-20 NOTE — PATIENT INSTRUCTIONS
may tell you to chew 1 adult-strength or 2 to 4 low-dose aspirin. Wait for an ambulance. Do not try to drive yourself.  Watch closely for changes in your health, and be sure to contact your doctor if you have any problems.  Where can you learn more?  Go to https://www.Card[employer].net/patientEd and enter F075 to learn more about \"A Healthy Heart: Care Instructions.\"  Current as of: June 24, 2023  Content Version: 14.2  © 2024 [employer].   Care instructions adapted under license by [employer]. If you have questions about a medical condition or this instruction, always ask your healthcare professional. [employer], [employer] disclaims any warranty or liability for your use of this information.      Personalized Preventive Plan for Michael Carter - 11/21/2024  Medicare offers a range of preventive health benefits. Some of the tests and screenings are paid in full while other may be subject to a deductible, co-insurance, and/or copay.    Some of these benefits include a comprehensive review of your medical history including lifestyle, illnesses that may run in your family, and various assessments and screenings as appropriate.    After reviewing your medical record and screening and assessments performed today your provider may have ordered immunizations, labs, imaging, and/or referrals for you.  A list of these orders (if applicable) as well as your Preventive Care list are included within your After Visit Summary for your review.    Other Preventive Recommendations:    A preventive eye exam performed by an eye specialist is recommended every 1-2 years to screen for glaucoma; cataracts, macular degeneration, and other eye disorders.  A preventive dental visit is recommended every 6 months.  Try to get at least 150 minutes of exercise per week or 10,000 steps per day on a pedometer .  Order or download the FREE \"Exercise & Physical Activity: Your Everyday Guide\" from [employer] on

## 2024-11-21 ENCOUNTER — OFFICE VISIT (OUTPATIENT)
Dept: FAMILY MEDICINE CLINIC | Age: 64
End: 2024-11-21

## 2024-11-21 VITALS
WEIGHT: 237 LBS | DIASTOLIC BLOOD PRESSURE: 82 MMHG | HEART RATE: 71 BPM | BODY MASS INDEX: 32.1 KG/M2 | HEIGHT: 72 IN | SYSTOLIC BLOOD PRESSURE: 122 MMHG | OXYGEN SATURATION: 96 %

## 2024-11-21 DIAGNOSIS — E78.00 PURE HYPERCHOLESTEROLEMIA: ICD-10-CM

## 2024-11-21 DIAGNOSIS — E11.9 TYPE 2 DIABETES MELLITUS WITHOUT COMPLICATION, WITHOUT LONG-TERM CURRENT USE OF INSULIN (HCC): ICD-10-CM

## 2024-11-21 DIAGNOSIS — F41.1 ANXIETY STATE: ICD-10-CM

## 2024-11-21 DIAGNOSIS — N52.02 CORPORO-VENOUS OCCLUSIVE ERECTILE DYSFUNCTION: ICD-10-CM

## 2024-11-21 DIAGNOSIS — Z23 NEED FOR INFLUENZA VACCINATION: ICD-10-CM

## 2024-11-21 DIAGNOSIS — Z12.5 SCREENING FOR MALIGNANT NEOPLASM OF PROSTATE: ICD-10-CM

## 2024-11-21 DIAGNOSIS — Z12.11 SCREEN FOR COLON CANCER: ICD-10-CM

## 2024-11-21 DIAGNOSIS — I10 PRIMARY HYPERTENSION: ICD-10-CM

## 2024-11-21 DIAGNOSIS — Z00.00 INITIAL MEDICARE ANNUAL WELLNESS VISIT: Primary | ICD-10-CM

## 2024-11-21 LAB
ALBUMIN SERPL-MCNC: 4.6 G/DL (ref 3.4–5)
ALBUMIN/GLOB SERPL: 1.9 {RATIO} (ref 1.1–2.2)
ALP SERPL-CCNC: 91 U/L (ref 40–129)
ALT SERPL-CCNC: 18 U/L (ref 10–40)
ANION GAP SERPL CALCULATED.3IONS-SCNC: 13 MMOL/L (ref 3–16)
AST SERPL-CCNC: 17 U/L (ref 15–37)
BILIRUB SERPL-MCNC: 0.7 MG/DL (ref 0–1)
BUN SERPL-MCNC: 16 MG/DL (ref 7–20)
CALCIUM SERPL-MCNC: 9.8 MG/DL (ref 8.3–10.6)
CHLORIDE SERPL-SCNC: 99 MMOL/L (ref 99–110)
CHOLEST SERPL-MCNC: 141 MG/DL (ref 0–199)
CO2 SERPL-SCNC: 23 MMOL/L (ref 21–32)
CREAT SERPL-MCNC: 0.9 MG/DL (ref 0.8–1.3)
CREAT UR-MCNC: 204 MG/DL (ref 39–259)
GFR SERPLBLD CREATININE-BSD FMLA CKD-EPI: >90 ML/MIN/{1.73_M2}
GLUCOSE SERPL-MCNC: 205 MG/DL (ref 70–99)
HBA1C MFR BLD: 6.6 %
HDLC SERPL-MCNC: 38 MG/DL (ref 40–60)
LDLC SERPL CALC-MCNC: 84 MG/DL
MICROALBUMIN UR DL<=1MG/L-MCNC: 2.82 MG/DL
MICROALBUMIN/CREAT UR: 13.8 MG/G (ref 0–30)
POTASSIUM SERPL-SCNC: 5 MMOL/L (ref 3.5–5.1)
PROT SERPL-MCNC: 7 G/DL (ref 6.4–8.2)
PSA SERPL DL<=0.01 NG/ML-MCNC: 0.58 NG/ML (ref 0–4)
SODIUM SERPL-SCNC: 135 MMOL/L (ref 136–145)
TRIGL SERPL-MCNC: 96 MG/DL (ref 0–150)
VLDLC SERPL CALC-MCNC: 19 MG/DL

## 2024-11-21 RX ORDER — LOSARTAN POTASSIUM 100 MG/1
100 TABLET ORAL DAILY
Qty: 90 TABLET | Refills: 1 | Status: SHIPPED | OUTPATIENT
Start: 2024-11-21

## 2024-11-21 RX ORDER — SITAGLIPTIN AND METFORMIN HYDROCHLORIDE 500; 50 MG/1; MG/1
1 TABLET, FILM COATED ORAL 2 TIMES DAILY WITH MEALS
Qty: 180 TABLET | Refills: 1 | Status: SHIPPED | OUTPATIENT
Start: 2024-11-21

## 2024-11-21 RX ORDER — ROSUVASTATIN CALCIUM 5 MG/1
5 TABLET, COATED ORAL DAILY
Qty: 90 TABLET | Refills: 1 | Status: SHIPPED | OUTPATIENT
Start: 2024-11-21

## 2024-11-21 SDOH — ECONOMIC STABILITY: INCOME INSECURITY: IN THE LAST 12 MONTHS, WAS THERE A TIME WHEN YOU WERE NOT ABLE TO PAY THE MORTGAGE OR RENT ON TIME?: NO

## 2024-11-21 NOTE — PROGRESS NOTES
Risks and benefits explained.  Current VIS given.  Consent signed.     Immunizations Administered       Name Date Dose Route    Influenza, FLUAD, (age 65 y+), IM, Trivalent PF, 0.5mL 11/21/2024 0.5 mL Intramuscular    Site: Deltoid- Left    Lot: [id number]    NDC: 32643-238-64

## 2024-11-21 NOTE — PROGRESS NOTES
Medicare Annual Wellness Visit    Michael Carter is here for Medicare AWV (MEDICARE ANNUAL WELLNESS VISIT) and Other (Pt will call and schedule his eye appt, goes to \[facility]\"" in [facility], been more than a year since he has been in to see them )    Assessment & Plan   Initial Medicare annual wellness visit  -Healthcare topics addressed as below  -Fasting labs  -Continue medications as ordered  -Flu shot today  -Referral for colonoscopy placed  -Encouraged to get other vaccines at local pharmacy  -Will try to reconcile eye exam  -Follow-up in 6 months, or sooner if needed  Type 2 diabetes mellitus without complication, without long-term current use of insulin (HCC)  -Controlled.  A1C 6.6% today.  Continue Janumet.  Follow-up in 6 months.  -Microalbuminuria testing updated today.  Will try to reconcile eye exam record.  Foot exam up-to-date.  -     POCT glycosylated hemoglobin (Hb A1C)  -     Microalbumin / Creatinine Urine Ratio; Future  -     Lipid Panel; Future  -     Comprehensive Metabolic Panel; Future  -     JANUMET  MG per tablet; Take 1 tablet by mouth with breakfast and with evening meal, Disp-180 tablet, R-1, DAWNormal  Primary hypertension  -Controlled.  Continue losartan.  Fasting labs today.  Follow-up in 6 months.  -     Comprehensive Metabolic Panel; Future  -     losartan (COZAAR) 100 MG tablet; Take 1 tablet by mouth daily, Disp-90 tablet, R-1Normal  Pure hypercholesterolemia  -Controlled.  Continue rosuvastatin.  Fasting labs today.  Follow-up in 6 months.  -     Lipid Panel; Future  -     Comprehensive Metabolic Panel; Future  -     rosuvastatin (CRESTOR) 5 MG tablet; Take 1 tablet by mouth daily, Disp-90 tablet, R-1Normal  Anxiety state  -Controlled.  Continue sertraline.  Fasting labs today.  Follow-up in 6 months.  -     sertraline (ZOLOFT) 50 MG tablet; TAKE 1 TABLET DAILY, Disp-90 tablet, R-1Normal  Corporo-venous occlusive erectile dysfunction  Screening for malignant neoplasm of

## 2025-02-14 DIAGNOSIS — E11.9 TYPE 2 DIABETES MELLITUS WITHOUT COMPLICATION, WITHOUT LONG-TERM CURRENT USE OF INSULIN (HCC): ICD-10-CM

## 2025-02-14 RX ORDER — SITAGLIPTIN AND METFORMIN HYDROCHLORIDE 500; 50 MG/1; MG/1
TABLET, FILM COATED ORAL
Qty: 180 TABLET | Refills: 1 | OUTPATIENT
Start: 2025-02-14

## 2025-03-24 DIAGNOSIS — F41.1 ANXIETY STATE: ICD-10-CM

## 2025-05-21 DIAGNOSIS — E11.9 TYPE 2 DIABETES MELLITUS WITHOUT COMPLICATION, WITHOUT LONG-TERM CURRENT USE OF INSULIN (HCC): ICD-10-CM

## 2025-05-21 RX ORDER — SITAGLIPTIN AND METFORMIN HYDROCHLORIDE 500; 50 MG/1; MG/1
TABLET, FILM COATED ORAL
Qty: 180 TABLET | Refills: 0 | Status: SHIPPED | OUTPATIENT
Start: 2025-05-21

## 2025-05-21 NOTE — TELEPHONE ENCOUNTER
LV 11/21/24 WITH TR FOR AWV NV NONE  Return in about 6 months (around 5/21/2025) for Follow up Diabetes 40 min.

## 2025-06-16 DIAGNOSIS — E78.00 PURE HYPERCHOLESTEROLEMIA: ICD-10-CM

## 2025-06-16 NOTE — TELEPHONE ENCOUNTER
Patient was calling to see if we got his med refill. He has been out of the medication for a few days. He is scheduled with Dr Margret Cardona Jun 24, 2025   Appt at 3:30 PM (30 min)

## 2025-06-17 RX ORDER — ROSUVASTATIN CALCIUM 5 MG/1
5 TABLET, COATED ORAL DAILY
Qty: 90 TABLET | Refills: 0 | Status: SHIPPED | OUTPATIENT
Start: 2025-06-17

## 2025-06-17 NOTE — TELEPHONE ENCOUNTER
Patient called back to say that he called the Pharmacy last night and it is still not there. He doesn't understand why it takes a week or weeks to get his medications.     Can we please give him a call when it is filled

## 2025-06-19 DIAGNOSIS — F41.1 ANXIETY STATE: ICD-10-CM

## 2025-06-19 DIAGNOSIS — I10 PRIMARY HYPERTENSION: ICD-10-CM

## 2025-06-19 RX ORDER — LOSARTAN POTASSIUM 100 MG/1
100 TABLET ORAL DAILY
Qty: 90 TABLET | Refills: 0 | Status: SHIPPED | OUTPATIENT
Start: 2025-06-19

## 2025-07-14 ENCOUNTER — OFFICE VISIT (OUTPATIENT)
Dept: FAMILY MEDICINE CLINIC | Age: 65
End: 2025-07-14
Payer: MEDICARE

## 2025-07-14 VITALS
WEIGHT: 234 LBS | HEIGHT: 72 IN | OXYGEN SATURATION: 98 % | DIASTOLIC BLOOD PRESSURE: 80 MMHG | SYSTOLIC BLOOD PRESSURE: 120 MMHG | BODY MASS INDEX: 31.69 KG/M2 | HEART RATE: 70 BPM

## 2025-07-14 DIAGNOSIS — F41.1 ANXIETY STATE: ICD-10-CM

## 2025-07-14 DIAGNOSIS — N52.02 CORPORO-VENOUS OCCLUSIVE ERECTILE DYSFUNCTION: ICD-10-CM

## 2025-07-14 DIAGNOSIS — E78.00 PURE HYPERCHOLESTEROLEMIA: ICD-10-CM

## 2025-07-14 DIAGNOSIS — I10 PRIMARY HYPERTENSION: ICD-10-CM

## 2025-07-14 DIAGNOSIS — E11.9 TYPE 2 DIABETES MELLITUS WITHOUT COMPLICATION, WITHOUT LONG-TERM CURRENT USE OF INSULIN (HCC): Primary | ICD-10-CM

## 2025-07-14 LAB — HBA1C MFR BLD: 6.6 %

## 2025-07-14 PROCEDURE — 1123F ACP DISCUSS/DSCN MKR DOCD: CPT | Performed by: NURSE PRACTITIONER

## 2025-07-14 PROCEDURE — 1159F MED LIST DOCD IN RCRD: CPT | Performed by: NURSE PRACTITIONER

## 2025-07-14 PROCEDURE — 3079F DIAST BP 80-89 MM HG: CPT | Performed by: NURSE PRACTITIONER

## 2025-07-14 PROCEDURE — 3017F COLORECTAL CA SCREEN DOC REV: CPT | Performed by: NURSE PRACTITIONER

## 2025-07-14 PROCEDURE — 1036F TOBACCO NON-USER: CPT | Performed by: NURSE PRACTITIONER

## 2025-07-14 PROCEDURE — 3044F HG A1C LEVEL LT 7.0%: CPT | Performed by: NURSE PRACTITIONER

## 2025-07-14 PROCEDURE — G2211 COMPLEX E/M VISIT ADD ON: HCPCS | Performed by: NURSE PRACTITIONER

## 2025-07-14 PROCEDURE — 99214 OFFICE O/P EST MOD 30 MIN: CPT | Performed by: NURSE PRACTITIONER

## 2025-07-14 PROCEDURE — G8427 DOCREV CUR MEDS BY ELIG CLIN: HCPCS | Performed by: NURSE PRACTITIONER

## 2025-07-14 PROCEDURE — 83036 HEMOGLOBIN GLYCOSYLATED A1C: CPT | Performed by: NURSE PRACTITIONER

## 2025-07-14 PROCEDURE — 3074F SYST BP LT 130 MM HG: CPT | Performed by: NURSE PRACTITIONER

## 2025-07-14 PROCEDURE — 2022F DILAT RTA XM EVC RTNOPTHY: CPT | Performed by: NURSE PRACTITIONER

## 2025-07-14 PROCEDURE — G8417 CALC BMI ABV UP PARAM F/U: HCPCS | Performed by: NURSE PRACTITIONER

## 2025-07-14 PROCEDURE — 1160F RVW MEDS BY RX/DR IN RCRD: CPT | Performed by: NURSE PRACTITIONER

## 2025-07-14 RX ORDER — TADALAFIL 10 MG/1
10-20 TABLET ORAL DAILY PRN
Qty: 30 TABLET | Refills: 2 | Status: SHIPPED | OUTPATIENT
Start: 2025-07-14

## 2025-07-14 RX ORDER — TADALAFIL 10 MG/1
10-20 TABLET ORAL DAILY PRN
Qty: 30 TABLET | Refills: 2 | Status: SHIPPED | OUTPATIENT
Start: 2025-07-14 | End: 2025-07-14

## 2025-07-14 RX ORDER — LOSARTAN POTASSIUM 100 MG/1
100 TABLET ORAL DAILY
Qty: 90 TABLET | Refills: 1 | Status: SHIPPED | OUTPATIENT
Start: 2025-07-14

## 2025-07-14 RX ORDER — ROSUVASTATIN CALCIUM 5 MG/1
5 TABLET, COATED ORAL DAILY
Qty: 90 TABLET | Refills: 1 | Status: SHIPPED | OUTPATIENT
Start: 2025-07-14

## 2025-07-14 RX ORDER — SITAGLIPTIN AND METFORMIN HYDROCHLORIDE 500; 50 MG/1; MG/1
1 TABLET, FILM COATED ORAL 2 TIMES DAILY WITH MEALS
Qty: 180 TABLET | Refills: 1 | Status: SHIPPED | OUTPATIENT
Start: 2025-07-14

## 2025-07-14 SDOH — ECONOMIC STABILITY: FOOD INSECURITY: WITHIN THE PAST 12 MONTHS, THE FOOD YOU BOUGHT JUST DIDN'T LAST AND YOU DIDN'T HAVE MONEY TO GET MORE.: NEVER TRUE

## 2025-07-14 SDOH — ECONOMIC STABILITY: FOOD INSECURITY: WITHIN THE PAST 12 MONTHS, YOU WORRIED THAT YOUR FOOD WOULD RUN OUT BEFORE YOU GOT MONEY TO BUY MORE.: NEVER TRUE

## 2025-07-14 ASSESSMENT — ENCOUNTER SYMPTOMS
VOMITING: 0
SORE THROAT: 0
ABDOMINAL DISTENTION: 0
SINUS PRESSURE: 0
NAUSEA: 0
COUGH: 0
DIARRHEA: 0
EYE DISCHARGE: 0
EYE REDNESS: 0
EYE PAIN: 0
ABDOMINAL PAIN: 0
SHORTNESS OF BREATH: 0
SINUS PAIN: 0
WHEEZING: 0

## 2025-07-14 ASSESSMENT — PATIENT HEALTH QUESTIONNAIRE - PHQ9
1. LITTLE INTEREST OR PLEASURE IN DOING THINGS: NOT AT ALL
SUM OF ALL RESPONSES TO PHQ QUESTIONS 1-9: 0
2. FEELING DOWN, DEPRESSED OR HOPELESS: NOT AT ALL

## 2025-07-14 NOTE — ASSESSMENT & PLAN NOTE
Controlled.  Continue losartan.  Follow-up in November for AWV, or sooner if needed.    Orders:    losartan (COZAAR) 100 MG tablet; Take 1 tablet by mouth daily

## 2025-07-14 NOTE — PROGRESS NOTES
Michael Carter (:  1960) is a 65 y.o. male,Established patient, here for evaluation of the following chief complaint(s):  Diabetes (FOLLOW UP ON DIABETES, A1C DUE TODAY )      Assessment & Plan  Type 2 diabetes mellitus without complication, without long-term current use of insulin (HCC)   Controlled.  A1c is 6.6% today.  Continue Janumet as ordered.  Foot exam performed today and normal.  Reconciling diabetic eye exam which patient brought to the office today.  Follow-up in November for AWV/fasting labs, or sooner if needed.    Orders:    POCT glycosylated hemoglobin (Hb A1C)    HM DIABETES FOOT EXAM    sitaGLIPtan-metFORMIN (JANUMET)  MG per tablet; Take 1 tablet by mouth 2 times daily (with meals)    Primary hypertension   Controlled.  Continue losartan.  Follow-up in November for AWV, or sooner if needed.    Orders:    losartan (COZAAR) 100 MG tablet; Take 1 tablet by mouth daily    Corporo-venous occlusive erectile dysfunction   Uncontrolled.  Increase Cialis up to 20 mg daily.  Follow-up in November for AWV, or sooner if needed.    Orders:    tadalafil (CIALIS) 10 MG tablet; Take 1-2 tablets by mouth daily as needed for Erectile Dysfunction    Anxiety state   Controlled.  Continue sertraline.  Follow-up in November for AWV, or sooner if needed.    Orders:    sertraline (ZOLOFT) 50 MG tablet; Take 1 tablet by mouth daily    Pure hypercholesterolemia   Controlled.  Continue rosuvastatin.  Follow-up in November for AWV, or sooner if needed.    Orders:    rosuvastatin (CRESTOR) 5 MG tablet; Take 1 tablet by mouth daily            Return in about 4 months (around 2025) for Medicare Annual Wellness Visit/Fasting Labs.    SUBJECTIVE/OBJECTIVE:  History of Present Illness  The patient is a 65-year-old male presenting today for a follow-up on his chronic conditions.    He reports no new health concerns and maintains that his current medication regimen is effective. He has undergone an eye

## 2025-07-14 NOTE — ASSESSMENT & PLAN NOTE
Controlled.  A1c is 6.6% today.  Continue Janumet as ordered.  Foot exam performed today and normal.  Reconciling diabetic eye exam which patient brought to the office today.  Follow-up in November for AWV/fasting labs, or sooner if needed.    Orders:    POCT glycosylated hemoglobin (Hb A1C)    HM DIABETES FOOT EXAM    sitaGLIPtan-metFORMIN (JANUMET)  MG per tablet; Take 1 tablet by mouth 2 times daily (with meals)

## 2025-07-14 NOTE — ASSESSMENT & PLAN NOTE
Controlled.  Continue rosuvastatin.  Follow-up in November for AWV, or sooner if needed.    Orders:    rosuvastatin (CRESTOR) 5 MG tablet; Take 1 tablet by mouth daily

## 2025-07-14 NOTE — ASSESSMENT & PLAN NOTE
Controlled.  Continue sertraline.  Follow-up in November for AWV, or sooner if needed.    Orders:    sertraline (ZOLOFT) 50 MG tablet; Take 1 tablet by mouth daily